# Patient Record
Sex: FEMALE | Race: WHITE | NOT HISPANIC OR LATINO
[De-identification: names, ages, dates, MRNs, and addresses within clinical notes are randomized per-mention and may not be internally consistent; named-entity substitution may affect disease eponyms.]

---

## 2017-04-13 ENCOUNTER — RESULT REVIEW (OUTPATIENT)
Age: 67
End: 2017-04-13

## 2020-10-30 ENCOUNTER — APPOINTMENT (OUTPATIENT)
Dept: INTERNAL MEDICINE | Facility: CLINIC | Age: 70
End: 2020-10-30
Payer: MEDICARE

## 2020-10-30 VITALS
RESPIRATION RATE: 18 BRPM | OXYGEN SATURATION: 96 % | DIASTOLIC BLOOD PRESSURE: 82 MMHG | SYSTOLIC BLOOD PRESSURE: 144 MMHG | HEIGHT: 65 IN | HEART RATE: 77 BPM | WEIGHT: 145 LBS | TEMPERATURE: 97.2 F | BODY MASS INDEX: 24.16 KG/M2

## 2020-10-30 DIAGNOSIS — Z82.61 FAMILY HISTORY OF ARTHRITIS: ICD-10-CM

## 2020-10-30 DIAGNOSIS — Z87.39 PERSONAL HISTORY OF OTHER DISEASES OF THE MUSCULOSKELETAL SYSTEM AND CONNECTIVE TISSUE: ICD-10-CM

## 2020-10-30 DIAGNOSIS — Z86.79 PERSONAL HISTORY OF OTHER DISEASES OF THE CIRCULATORY SYSTEM: ICD-10-CM

## 2020-10-30 DIAGNOSIS — Z82.62 FAMILY HISTORY OF OSTEOPOROSIS: ICD-10-CM

## 2020-10-30 DIAGNOSIS — Z80.9 FAMILY HISTORY OF MALIGNANT NEOPLASM, UNSPECIFIED: ICD-10-CM

## 2020-10-30 PROCEDURE — 99205 OFFICE O/P NEW HI 60 MIN: CPT

## 2020-10-30 RX ORDER — DULOXETINE HYDROCHLORIDE 40 MG/1
CAPSULE, DELAYED RELEASE PELLETS ORAL
Refills: 0 | Status: ACTIVE | COMMUNITY

## 2020-10-30 RX ORDER — PREDNISONE 10 MG/1
TABLET ORAL
Refills: 0 | Status: ACTIVE | COMMUNITY

## 2020-10-30 RX ORDER — LOSARTAN POTASSIUM 100 MG/1
TABLET, FILM COATED ORAL
Refills: 0 | Status: ACTIVE | COMMUNITY

## 2020-10-30 RX ORDER — METOPROLOL SUCCINATE 200 MG/1
TABLET, EXTENDED RELEASE ORAL
Refills: 0 | Status: ACTIVE | COMMUNITY

## 2020-10-30 RX ORDER — ATORVASTATIN CALCIUM 80 MG/1
TABLET, FILM COATED ORAL
Refills: 0 | Status: ACTIVE | COMMUNITY

## 2020-10-30 NOTE — COUNSELING
[Hazards of at-risk alcohol use discussed] : Hazards of at-risk alcohol use discussed [Strategies to reduce or eliminate alcohol use discussed] : Strategies to reduce or eliminate alcohol use discussed [Support options provided] : Support options provided

## 2020-11-03 NOTE — HISTORY OF PRESENT ILLNESS
[FreeTextEntry1] : initial evaluation [de-identified] : Ms. Greenfield presents for initial medical evaluation. She was recently followed by Dr. Nance. Patient is accompanied by her daughter. Over the past 16-24 months Mrs. Greenfield has been having multiple falls. In January she had a fall which caused her to protein fingers and facial lacerations. In June she had a fall which caused bruised ribs as well as a laceration her face and lip. Gen. fall this month which causes low back pain. As per the daughter, Mrs. Greenfield is also forgetful. She will leave doors unlocked, forget her car keys and has forgotten her grandchildren's per days. Mrs. Greenfield did have a neurology evaluation at [facility] prior to culvert pandemic and had an MRI. Patient does admit to significant alcohol use. She is drinking at least 3-4 glasses of wine per day. She will have a glass of wine first thing in the morning. She is also probably drinking vodka as well. Mrs. Greenfield states that this started after her  passed away at least a year ago.

## 2020-11-03 NOTE — PLAN
[FreeTextEntry1] : Mrs. Roberts presents for an initial evaluation. There are multiple issues including cognitive difficulties and frequent falls. I believe that her main issue is alcohol abuse. I've discussed this with the patient and her daughter and tried to emphasize the fact that it is not normal to wake up and start today with a glass of wine. I explained to the patient I think she is drinking more than she is admitting to and that in order to address this issue she has to admit herself that she has a problem drinking. I have recommended that she followup with a neurologist. She plans to followup with Dr. Hiro Khan. Mrs. Greenfield is seeing a therapist, however, as she previously had seen Dr. Darryl Goldman for psychiatry. I have encouraged her to followup with him. Patient will go for a comprehensive blood profile and followup in this office in one month.

## 2020-11-03 NOTE — HEALTH RISK ASSESSMENT
[Yes] : Yes [No] : In the past 12 months have you used drugs other than those required for medical reasons? No [Smoke Detector] : smoke detector [Carbon Monoxide Detector] : carbon monoxide detector [Seat Belt] :  uses seat belt [Sunscreen] : uses sunscreen [Very Good] : ~his/her~  mood as very good [4 or more  times a week (4 pts)] : 4 or more  times a week (4 points) [3 or 4 (1 pt)] : 3 or 4  (1 point) [Monthly (2 pts)] : Monthly (2 points) [Two or more falls in past year] : Patient reported two or more falls in the past year [1] : 2) Feeling down, depressed, or hopeless for several days (1) [] : No [de-identified] : wine [Guns at Home] : no guns at home

## 2020-11-12 LAB
25(OH)D3 SERPL-MCNC: 45.7 NG/ML
ALBUMIN SERPL ELPH-MCNC: 4.3 G/DL
ALP BLD-CCNC: 76 U/L
ALT SERPL-CCNC: 43 U/L
ANION GAP SERPL CALC-SCNC: 14 MMOL/L
APPEARANCE: ABNORMAL
AST SERPL-CCNC: 44 U/L
BACTERIA: NEGATIVE
BASOPHILS # BLD AUTO: 0.06 K/UL
BASOPHILS NFR BLD AUTO: 0.8 %
BILIRUB SERPL-MCNC: 0.5 MG/DL
BILIRUBIN URINE: NEGATIVE
BLOOD URINE: NEGATIVE
BUN SERPL-MCNC: 10 MG/DL
CALCIUM SERPL-MCNC: 10 MG/DL
CHLORIDE SERPL-SCNC: 91 MMOL/L
CHOLEST SERPL-MCNC: 156 MG/DL
CO2 SERPL-SCNC: 25 MMOL/L
COLOR: NORMAL
CREAT SERPL-MCNC: 0.77 MG/DL
EOSINOPHIL # BLD AUTO: 0.36 K/UL
EOSINOPHIL NFR BLD AUTO: 4.9 %
FOLATE SERPL-MCNC: 6 NG/ML
GLUCOSE QUALITATIVE U: NEGATIVE
GLUCOSE SERPL-MCNC: 103 MG/DL
HCT VFR BLD CALC: 43.8 %
HDLC SERPL-MCNC: 48 MG/DL
HGB BLD-MCNC: 14.7 G/DL
HYALINE CASTS: 1 /LPF
IMM GRANULOCYTES NFR BLD AUTO: 0.5 %
IRON SERPL-MCNC: 124 UG/DL
KETONES URINE: NEGATIVE
LDLC SERPL CALC-MCNC: 85 MG/DL
LEUKOCYTE ESTERASE URINE: ABNORMAL
LYMPHOCYTES # BLD AUTO: 1.23 K/UL
LYMPHOCYTES NFR BLD AUTO: 16.6 %
MAN DIFF?: NORMAL
MCHC RBC-ENTMCNC: 33.6 GM/DL
MCHC RBC-ENTMCNC: 33.7 PG
MCV RBC AUTO: 100.5 FL
MICROSCOPIC-UA: NORMAL
MONOCYTES # BLD AUTO: 1.04 K/UL
MONOCYTES NFR BLD AUTO: 14.1 %
NEUTROPHILS # BLD AUTO: 4.67 K/UL
NEUTROPHILS NFR BLD AUTO: 63.1 %
NITRITE URINE: NEGATIVE
NONHDLC SERPL-MCNC: 108 MG/DL
PH URINE: 7.5
PLATELET # BLD AUTO: 419 K/UL
POTASSIUM SERPL-SCNC: 5.1 MMOL/L
PROT SERPL-MCNC: 6.6 G/DL
PROTEIN URINE: NEGATIVE
RBC # BLD: 4.36 M/UL
RBC # FLD: 12.2 %
RED BLOOD CELLS URINE: 4 /HPF
SODIUM SERPL-SCNC: 130 MMOL/L
SPECIFIC GRAVITY URINE: 1.02
SQUAMOUS EPITHELIAL CELLS: 4 /HPF
TRIGL SERPL-MCNC: 117 MG/DL
TSH SERPL-ACNC: 1.9 UIU/ML
UROBILINOGEN URINE: NORMAL
VIT B12 SERPL-MCNC: 552 PG/ML
WBC # FLD AUTO: 7.4 K/UL
WHITE BLOOD CELLS URINE: 5 /HPF

## 2020-11-20 ENCOUNTER — APPOINTMENT (OUTPATIENT)
Dept: INTERNAL MEDICINE | Facility: CLINIC | Age: 70
End: 2020-11-20
Payer: MEDICARE

## 2020-11-20 VITALS
DIASTOLIC BLOOD PRESSURE: 80 MMHG | HEIGHT: 65 IN | HEART RATE: 98 BPM | WEIGHT: 145 LBS | BODY MASS INDEX: 24.16 KG/M2 | RESPIRATION RATE: 16 BRPM | TEMPERATURE: 98.8 F | OXYGEN SATURATION: 97 % | SYSTOLIC BLOOD PRESSURE: 124 MMHG

## 2020-11-20 DIAGNOSIS — E78.5 HYPERLIPIDEMIA, UNSPECIFIED: ICD-10-CM

## 2020-11-20 PROCEDURE — G0008: CPT

## 2020-11-20 PROCEDURE — 90732 PPSV23 VACC 2 YRS+ SUBQ/IM: CPT

## 2020-11-20 PROCEDURE — 90662 IIV NO PRSV INCREASED AG IM: CPT

## 2020-11-20 PROCEDURE — G0009: CPT

## 2020-11-20 PROCEDURE — 99214 OFFICE O/P EST MOD 30 MIN: CPT | Mod: 25

## 2020-11-20 NOTE — HISTORY OF PRESENT ILLNESS
[FreeTextEntry1] : Followup evaluation [de-identified] : Mrs. Greenfield presents for a followup evaluation. She is feeling well. Patient states that she has not had a drink in the past 8 days, however, I spoke with her son via telephone prior to this office visit and he stated that she does continue to drink. Mrs. Greenfield continues to see a therapist as well as psychiatrist. Her Cymbalta was increased to 90 mg daily. She had a neurology evaluation by  and is currently undergoing a workup for her cognitive impairment.

## 2020-11-20 NOTE — PLAN
[FreeTextEntry1] : Mrs. Greenfield presents for followup evaluation. Comprehensive blood profile was reviewed with the patient. She is mild hyponatremia. She states she's been sober for the past 8 days, however, her son states she is still drinking. She will continue to followup with her therapist and her psychiatrist. This will repeat his CBC and complete metabolic profile. Followup in 3 months. She will receive the flu vaccine and a pneumococcal vaccine.

## 2021-02-08 RX ORDER — FENOFIBRIC ACID 105 MG/1
TABLET ORAL
Refills: 0 | Status: DISCONTINUED | COMMUNITY
End: 2021-02-08

## 2021-02-09 RX ORDER — FENOFIBRIC ACID 135 MG/1
135 CAPSULE, DELAYED RELEASE ORAL
Qty: 90 | Refills: 0 | Status: ACTIVE | COMMUNITY
Start: 1900-01-01 | End: 1900-01-01

## 2021-02-19 ENCOUNTER — APPOINTMENT (OUTPATIENT)
Dept: INTERNAL MEDICINE | Facility: CLINIC | Age: 71
End: 2021-02-19

## 2021-02-23 ENCOUNTER — LABORATORY RESULT (OUTPATIENT)
Age: 71
End: 2021-02-23

## 2021-03-02 ENCOUNTER — APPOINTMENT (OUTPATIENT)
Dept: INTERNAL MEDICINE | Facility: CLINIC | Age: 71
End: 2021-03-02
Payer: MEDICARE

## 2021-03-02 ENCOUNTER — NON-APPOINTMENT (OUTPATIENT)
Age: 71
End: 2021-03-02

## 2021-03-02 VITALS
WEIGHT: 142 LBS | BODY MASS INDEX: 23.66 KG/M2 | SYSTOLIC BLOOD PRESSURE: 124 MMHG | OXYGEN SATURATION: 96 % | HEART RATE: 72 BPM | TEMPERATURE: 97.5 F | HEIGHT: 65 IN | DIASTOLIC BLOOD PRESSURE: 84 MMHG | RESPIRATION RATE: 16 BRPM

## 2021-03-02 DIAGNOSIS — I10 ESSENTIAL (PRIMARY) HYPERTENSION: ICD-10-CM

## 2021-03-02 DIAGNOSIS — H26.9 UNSPECIFIED CATARACT: ICD-10-CM

## 2021-03-02 DIAGNOSIS — R41.89 OTHER SYMPTOMS AND SIGNS INVOLVING COGNITIVE FUNCTIONS AND AWARENESS: ICD-10-CM

## 2021-03-02 DIAGNOSIS — E87.1 HYPO-OSMOLALITY AND HYPONATREMIA: ICD-10-CM

## 2021-03-02 DIAGNOSIS — F41.8 OTHER SPECIFIED ANXIETY DISORDERS: ICD-10-CM

## 2021-03-02 PROCEDURE — 99214 OFFICE O/P EST MOD 30 MIN: CPT | Mod: 25

## 2021-03-02 PROCEDURE — 93000 ELECTROCARDIOGRAM COMPLETE: CPT

## 2021-03-02 NOTE — ASSESSMENT
[Patient Optimized for Surgery] : Patient optimized for surgery [ECG] : ECG [As per surgery] : as per surgery [FreeTextEntry4] : EKG shows a normal sinus rhythm with normal axis and intervals. There is no contraindication to planned cataract surgery with sedation/anesthesia.

## 2021-03-02 NOTE — HISTORY OF PRESENT ILLNESS
[No Pertinent Cardiac History] : no history of aortic stenosis, atrial fibrillation, coronary artery disease, recent myocardial infarction, or implantable device/pacemaker [No Pertinent Pulmonary History] : no history of asthma, COPD, sleep apnea, or smoking [No Adverse Anesthesia Reaction] : no adverse anesthesia reaction in self or family member [Chronic Anticoagulation] : no chronic anticoagulation [Chronic Kidney Disease] : no chronic kidney disease [Diabetes] : no diabetes [(Patient denies any chest pain, claudication, dyspnea on exertion, orthopnea, palpitations or syncope)] : Patient denies any chest pain, claudication, dyspnea on exertion, orthopnea, palpitations or syncope [FreeTextEntry1] : Cataract surgery [FreeTextEntry3] :

## 2021-03-05 ENCOUNTER — APPOINTMENT (OUTPATIENT)
Dept: DISASTER EMERGENCY | Facility: CLINIC | Age: 71
End: 2021-03-05

## 2021-03-05 RX ORDER — OFLOXACIN 0.3 %
1 DROPS OPHTHALMIC (EYE)
Refills: 0 | Status: COMPLETED | OUTPATIENT
Start: 2021-03-08 | End: 2021-03-08

## 2021-03-05 RX ORDER — TROPICAMIDE 1 %
1 DROPS OPHTHALMIC (EYE)
Refills: 0 | Status: COMPLETED | OUTPATIENT
Start: 2021-03-08 | End: 2021-03-08

## 2021-03-05 RX ORDER — CYCLOPENTOLATE HYDROCHLORIDE 10 MG/ML
1 SOLUTION/ DROPS OPHTHALMIC
Refills: 0 | Status: COMPLETED | OUTPATIENT
Start: 2021-03-08 | End: 2021-03-08

## 2021-03-05 RX ORDER — PHENYLEPHRINE HCL 2.5 %
1 DROPS OPHTHALMIC (EYE)
Refills: 0 | Status: COMPLETED | OUTPATIENT
Start: 2021-03-08 | End: 2021-03-08

## 2021-03-05 NOTE — ASU PATIENT PROFILE, ADULT - PMH
Cognitive impairment    Depression    ETOH abuse    Hypertension, unspecified type    Hyponatremia

## 2021-03-06 LAB — SARS-COV-2 N GENE NPH QL NAA+PROBE: NOT DETECTED

## 2021-03-08 ENCOUNTER — OUTPATIENT (OUTPATIENT)
Dept: INPATIENT UNIT | Facility: HOSPITAL | Age: 71
LOS: 1 days | Discharge: HOME | End: 2021-03-08
Payer: MEDICARE

## 2021-03-08 VITALS
OXYGEN SATURATION: 100 % | WEIGHT: 143.08 LBS | HEIGHT: 65 IN | DIASTOLIC BLOOD PRESSURE: 79 MMHG | TEMPERATURE: 97 F | SYSTOLIC BLOOD PRESSURE: 118 MMHG | RESPIRATION RATE: 16 BRPM | HEART RATE: 106 BPM

## 2021-03-08 VITALS
TEMPERATURE: 98 F | HEART RATE: 92 BPM | DIASTOLIC BLOOD PRESSURE: 80 MMHG | RESPIRATION RATE: 16 BRPM | OXYGEN SATURATION: 100 % | SYSTOLIC BLOOD PRESSURE: 119 MMHG

## 2021-03-08 DIAGNOSIS — Z98.890 OTHER SPECIFIED POSTPROCEDURAL STATES: Chronic | ICD-10-CM

## 2021-03-08 DIAGNOSIS — H53.9 UNSPECIFIED VISUAL DISTURBANCE: ICD-10-CM

## 2021-03-08 DIAGNOSIS — H25.11 AGE-RELATED NUCLEAR CATARACT, RIGHT EYE: ICD-10-CM

## 2021-03-08 PROCEDURE — C1780: CPT

## 2021-03-08 RX ORDER — ACETAMINOPHEN 500 MG
2 TABLET ORAL
Qty: 0 | Refills: 0 | DISCHARGE
Start: 2021-03-08

## 2021-03-08 RX ORDER — ACETAMINOPHEN 500 MG
650 TABLET ORAL EVERY 6 HOURS
Refills: 0 | Status: DISCONTINUED | OUTPATIENT
Start: 2021-03-08 | End: 2021-03-08

## 2021-03-08 RX ADMIN — CYCLOPENTOLATE HYDROCHLORIDE 1 DROP(S): 10 SOLUTION/ DROPS OPHTHALMIC at 14:33

## 2021-03-08 RX ADMIN — Medication 1 DROP(S): at 14:44

## 2021-03-08 RX ADMIN — Medication 1 DROP(S): at 14:32

## 2021-03-08 RX ADMIN — Medication 1 DROP(S): at 14:21

## 2021-03-08 RX ADMIN — CYCLOPENTOLATE HYDROCHLORIDE 1 DROP(S): 10 SOLUTION/ DROPS OPHTHALMIC at 14:44

## 2021-03-08 RX ADMIN — Medication 1 DROP(S): at 14:33

## 2021-03-08 RX ADMIN — CYCLOPENTOLATE HYDROCHLORIDE 1 DROP(S): 10 SOLUTION/ DROPS OPHTHALMIC at 14:22

## 2021-03-08 NOTE — BRIEF OPERATIVE NOTE - NSICDXBRIEFPOSTOP_GEN_ALL_CORE_FT
Contacted ER nurse, Yanique, regarding coordinating dialysis for patient this afternoon. Per nurse, patient still in ER and awaiting room, and possible plans for cardiac cath. Unknown when patient will be admitted to room and available to receive dialysis. Kayden LEIVA notified, orders to proceed with plans to dialyze patient tonight by on-call nurse after patient is admitted and back from procedure. Dialysis charge nurse, Melonie Oswald, notified.    POST-OP DIAGNOSIS:  Nuclear sclerotic cataract, right 08-Mar-2021 15:42:54  Camron Oliveira

## 2021-03-08 NOTE — ASU PREOP CHECKLIST - BSA (M2)
BREAST CARE COORDINATOR NOTE:  Patient contacted this RN stating that she can feel a hard oblong lump( Hematoma)  approximately 1 inch in size since her breast biopsy on 12/05/18. Patient states she still has some bruising as well. She notes the lump is not growing in size but not reabsorbing as she had hoped. Patient will keep an eye on it. Writer recommended that patient call her PCP for an office visit in the next week. She also understands that if this hematoma starts to become larger she should be seen in Urgent care or ER.
1.72

## 2021-03-08 NOTE — BRIEF OPERATIVE NOTE - NSICDXBRIEFPROCEDURE_GEN_ALL_CORE_FT
PROCEDURES:  Cataract extract, extracaps, phacoemuls, w/ prosth lens insertn, 1 stage 08-Mar-2021 15:42:30  Camron Oliveira

## 2021-03-12 DIAGNOSIS — H25.11 AGE-RELATED NUCLEAR CATARACT, RIGHT EYE: ICD-10-CM

## 2021-03-12 DIAGNOSIS — I10 ESSENTIAL (PRIMARY) HYPERTENSION: ICD-10-CM

## 2021-03-12 DIAGNOSIS — M19.90 UNSPECIFIED OSTEOARTHRITIS, UNSPECIFIED SITE: ICD-10-CM

## 2021-03-18 PROBLEM — E87.1 HYPO-OSMOLALITY AND HYPONATREMIA: Chronic | Status: ACTIVE | Noted: 2021-03-05

## 2021-03-18 PROBLEM — I10 ESSENTIAL (PRIMARY) HYPERTENSION: Chronic | Status: ACTIVE | Noted: 2021-03-05

## 2021-03-18 PROBLEM — F10.10 ALCOHOL ABUSE, UNCOMPLICATED: Chronic | Status: ACTIVE | Noted: 2021-03-05

## 2021-03-18 PROBLEM — R41.89 OTHER SYMPTOMS AND SIGNS INVOLVING COGNITIVE FUNCTIONS AND AWARENESS: Chronic | Status: ACTIVE | Noted: 2021-03-05

## 2021-03-18 PROBLEM — F32.9 MAJOR DEPRESSIVE DISORDER, SINGLE EPISODE, UNSPECIFIED: Chronic | Status: ACTIVE | Noted: 2021-03-05

## 2021-03-19 ENCOUNTER — RESULT REVIEW (OUTPATIENT)
Age: 71
End: 2021-03-19

## 2021-03-19 ENCOUNTER — APPOINTMENT (OUTPATIENT)
Dept: RADIOLOGY | Facility: CLINIC | Age: 71
End: 2021-03-19
Payer: MEDICARE

## 2021-03-19 ENCOUNTER — NON-APPOINTMENT (OUTPATIENT)
Age: 71
End: 2021-03-19

## 2021-03-19 ENCOUNTER — OUTPATIENT (OUTPATIENT)
Dept: OUTPATIENT SERVICES | Facility: HOSPITAL | Age: 71
LOS: 1 days | End: 2021-03-19
Payer: MEDICARE

## 2021-03-19 ENCOUNTER — APPOINTMENT (OUTPATIENT)
Dept: INTERNAL MEDICINE | Facility: CLINIC | Age: 71
End: 2021-03-19
Payer: MEDICARE

## 2021-03-19 VITALS
TEMPERATURE: 97.6 F | WEIGHT: 142 LBS | HEIGHT: 65 IN | DIASTOLIC BLOOD PRESSURE: 70 MMHG | BODY MASS INDEX: 23.66 KG/M2 | SYSTOLIC BLOOD PRESSURE: 110 MMHG | RESPIRATION RATE: 16 BRPM | OXYGEN SATURATION: 96 % | HEART RATE: 84 BPM

## 2021-03-19 DIAGNOSIS — Z98.890 OTHER SPECIFIED POSTPROCEDURAL STATES: Chronic | ICD-10-CM

## 2021-03-19 DIAGNOSIS — Z01.818 ENCOUNTER FOR OTHER PREPROCEDURAL EXAMINATION: ICD-10-CM

## 2021-03-19 DIAGNOSIS — M25.551 PAIN IN RIGHT HIP: ICD-10-CM

## 2021-03-19 DIAGNOSIS — Z23 ENCOUNTER FOR IMMUNIZATION: ICD-10-CM

## 2021-03-19 PROCEDURE — 73502 X-RAY EXAM HIP UNI 2-3 VIEWS: CPT | Mod: 26,RT

## 2021-03-19 PROCEDURE — 72100 X-RAY EXAM L-S SPINE 2/3 VWS: CPT

## 2021-03-19 PROCEDURE — 72100 X-RAY EXAM L-S SPINE 2/3 VWS: CPT | Mod: 26

## 2021-03-19 PROCEDURE — 73502 X-RAY EXAM HIP UNI 2-3 VIEWS: CPT

## 2021-03-19 PROCEDURE — 99214 OFFICE O/P EST MOD 30 MIN: CPT

## 2021-03-19 RX ORDER — TIZANIDINE 2 MG/1
2 TABLET ORAL
Qty: 20 | Refills: 0 | Status: ACTIVE | COMMUNITY
Start: 2021-03-19 | End: 1900-01-01

## 2021-03-19 RX ORDER — METHYLPREDNISOLONE 4 MG/1
4 TABLET ORAL
Qty: 1 | Refills: 0 | Status: ACTIVE | COMMUNITY
Start: 2021-03-19 | End: 1900-01-01

## 2021-03-19 NOTE — PHYSICAL EXAM
[No Acute Distress] : no acute distress [Well Nourished] : well nourished [Well Developed] : well developed [No Accessory Muscle Use] : no accessory muscle use [Clear to Auscultation] : lungs were clear to auscultation bilaterally [Normal Rate] : normal rate  [Regular Rhythm] : with a regular rhythm [Normal S1, S2] : normal S1 and S2 [No Joint Swelling] : no joint swelling [Grossly Normal Strength/Tone] : grossly normal strength/tone [Coordination Grossly Intact] : coordination grossly intact [No Focal Deficits] : no focal deficits [Normal Gait] : normal gait [Normal Affect] : the affect was normal [Alert and Oriented x3] : oriented to person, place, and time [Normal Insight/Judgement] : insight and judgment were intact [de-identified] : lumbar tenderness on palpation

## 2021-03-19 NOTE — ASSESSMENT
[FreeTextEntry1] : lumbar/ right hip pain\par XRAY lumbar, pelvis  right hip r/o bony abnormality \par Medrol dose pack, take with food'\par Tizanidine 4 mp po q 8 hrs PRN, spasm. Pt understands may cause drowsiness\par F/up with XRAY results\par IF sx's persist , recommend consult  with ORTHO \par \par

## 2021-03-19 NOTE — HISTORY OF PRESENT ILLNESS
[FreeTextEntry8] : Pt presents  to the office today with complaints of lumbar back pain and right hip pain. She does not recall any precipitating events. She reports feels like a dull ache, has been taking Advil round the clock for the last 2 days which has helped. Denies any radiculopathy symptoms. Is having trouble sleeping at night, She has full mobility and range of motion. She as some difficulty with activities of daily living. \par Denies fever, chills, shortness of  breath, chest pain, N/V, dizziness.

## 2021-03-19 NOTE — REVIEW OF SYSTEMS
[Chills] : no chills [Fatigue] : no fatigue [Joint Stiffness] : no joint stiffness [Joint Swelling] : no joint swelling [Muscle Weakness] : no muscle weakness [Muscle Pain] : muscle pain [Back Pain] : back pain [Negative] : Heme/Lymph [FreeTextEntry9] : see HPI

## 2021-03-21 ENCOUNTER — TRANSCRIPTION ENCOUNTER (OUTPATIENT)
Age: 71
End: 2021-03-21

## 2021-04-10 ENCOUNTER — TRANSCRIPTION ENCOUNTER (OUTPATIENT)
Age: 71
End: 2021-04-10

## 2021-04-15 DIAGNOSIS — Z01.818 ENCOUNTER FOR OTHER PREPROCEDURAL EXAMINATION: ICD-10-CM

## 2021-04-16 ENCOUNTER — APPOINTMENT (OUTPATIENT)
Dept: DISASTER EMERGENCY | Facility: CLINIC | Age: 71
End: 2021-04-16

## 2021-04-17 LAB — SARS-COV-2 N GENE NPH QL NAA+PROBE: NOT DETECTED

## 2021-04-19 ENCOUNTER — OUTPATIENT (OUTPATIENT)
Dept: INPATIENT UNIT | Facility: HOSPITAL | Age: 71
LOS: 1 days | Discharge: HOME | End: 2021-04-19
Payer: MEDICARE

## 2021-04-19 VITALS
DIASTOLIC BLOOD PRESSURE: 57 MMHG | RESPIRATION RATE: 15 BRPM | HEART RATE: 72 BPM | TEMPERATURE: 99 F | SYSTOLIC BLOOD PRESSURE: 95 MMHG | OXYGEN SATURATION: 100 %

## 2021-04-19 VITALS
TEMPERATURE: 97 F | OXYGEN SATURATION: 99 % | HEIGHT: 65 IN | WEIGHT: 141.98 LBS | HEART RATE: 74 BPM | RESPIRATION RATE: 16 BRPM | DIASTOLIC BLOOD PRESSURE: 71 MMHG | SYSTOLIC BLOOD PRESSURE: 112 MMHG

## 2021-04-19 DIAGNOSIS — H25.12 AGE-RELATED NUCLEAR CATARACT, LEFT EYE: ICD-10-CM

## 2021-04-19 DIAGNOSIS — H26.9 UNSPECIFIED CATARACT: ICD-10-CM

## 2021-04-19 DIAGNOSIS — F10.10 ALCOHOL ABUSE, UNCOMPLICATED: ICD-10-CM

## 2021-04-19 DIAGNOSIS — F41.9 ANXIETY DISORDER, UNSPECIFIED: ICD-10-CM

## 2021-04-19 DIAGNOSIS — E78.5 HYPERLIPIDEMIA, UNSPECIFIED: ICD-10-CM

## 2021-04-19 DIAGNOSIS — Z98.890 OTHER SPECIFIED POSTPROCEDURAL STATES: Chronic | ICD-10-CM

## 2021-04-19 DIAGNOSIS — F32.9 MAJOR DEPRESSIVE DISORDER, SINGLE EPISODE, UNSPECIFIED: ICD-10-CM

## 2021-04-19 DIAGNOSIS — I10 ESSENTIAL (PRIMARY) HYPERTENSION: ICD-10-CM

## 2021-04-19 DIAGNOSIS — H53.9 UNSPECIFIED VISUAL DISTURBANCE: ICD-10-CM

## 2021-04-19 DIAGNOSIS — Z79.899 OTHER LONG TERM (CURRENT) DRUG THERAPY: ICD-10-CM

## 2021-04-19 PROCEDURE — V2632: CPT

## 2021-04-19 RX ORDER — PHENYLEPHRINE HCL 2.5 %
1 DROPS OPHTHALMIC (EYE)
Refills: 0 | Status: COMPLETED | OUTPATIENT
Start: 2021-04-19 | End: 2021-04-19

## 2021-04-19 RX ORDER — ACETAMINOPHEN 500 MG
650 TABLET ORAL EVERY 6 HOURS
Refills: 0 | Status: DISCONTINUED | OUTPATIENT
Start: 2021-04-19 | End: 2021-04-19

## 2021-04-19 RX ORDER — TROPICAMIDE 1 %
1 DROPS OPHTHALMIC (EYE)
Refills: 0 | Status: COMPLETED | OUTPATIENT
Start: 2021-04-19 | End: 2021-04-19

## 2021-04-19 RX ORDER — CYCLOPENTOLATE HYDROCHLORIDE 10 MG/ML
1 SOLUTION/ DROPS OPHTHALMIC
Refills: 0 | Status: COMPLETED | OUTPATIENT
Start: 2021-04-19 | End: 2021-04-19

## 2021-04-19 RX ORDER — OFLOXACIN 0.3 %
1 DROPS OPHTHALMIC (EYE)
Refills: 0 | Status: COMPLETED | OUTPATIENT
Start: 2021-04-19 | End: 2021-04-19

## 2021-04-19 RX ADMIN — Medication 1 DROP(S): at 11:19

## 2021-04-19 RX ADMIN — CYCLOPENTOLATE HYDROCHLORIDE 1 DROP(S): 10 SOLUTION/ DROPS OPHTHALMIC at 11:29

## 2021-04-19 RX ADMIN — Medication 1 DROP(S): at 11:29

## 2021-04-19 RX ADMIN — Medication 1 DROP(S): at 11:20

## 2021-04-19 RX ADMIN — Medication 1 DROP(S): at 11:07

## 2021-04-19 RX ADMIN — CYCLOPENTOLATE HYDROCHLORIDE 1 DROP(S): 10 SOLUTION/ DROPS OPHTHALMIC at 11:07

## 2021-04-19 RX ADMIN — Medication 1 DROP(S): at 10:50

## 2021-04-19 RX ADMIN — CYCLOPENTOLATE HYDROCHLORIDE 1 DROP(S): 10 SOLUTION/ DROPS OPHTHALMIC at 11:20

## 2021-04-19 NOTE — ASU DISCHARGE PLAN (ADULT/PEDIATRIC) - PROCEDURE
Here for  hormone therapy injection, no complaints at this time , Injection given as ordered, tolerated well, no report of pain prior to or after injection. Return to clinic as scheduled.     Site - RB    Testosterone  50 mg  Depo Estradiol  10  mg    Clinic Supplied Medication   cataract sx

## 2021-04-19 NOTE — BRIEF OPERATIVE NOTE - NSICDXBRIEFPROCEDURE_GEN_ALL_CORE_FT
PROCEDURES:  Cataract extract, extracaps, phacoemuls, w/ prosth lens insertn, 1 stage 19-Apr-2021 12:33:25  Camron Oliveira

## 2021-09-14 ENCOUNTER — APPOINTMENT (OUTPATIENT)
Dept: INTERNAL MEDICINE | Facility: CLINIC | Age: 71
End: 2021-09-14

## 2022-02-16 ENCOUNTER — EMERGENCY (EMERGENCY)
Facility: HOSPITAL | Age: 72
LOS: 0 days | Discharge: HOME | End: 2022-02-16
Attending: EMERGENCY MEDICINE
Payer: MEDICARE

## 2022-02-16 VITALS
OXYGEN SATURATION: 100 % | RESPIRATION RATE: 18 BRPM | HEART RATE: 80 BPM | SYSTOLIC BLOOD PRESSURE: 131 MMHG | TEMPERATURE: 98 F | DIASTOLIC BLOOD PRESSURE: 80 MMHG

## 2022-02-16 VITALS
TEMPERATURE: 98 F | DIASTOLIC BLOOD PRESSURE: 86 MMHG | WEIGHT: 149.91 LBS | SYSTOLIC BLOOD PRESSURE: 129 MMHG | HEART RATE: 87 BPM | RESPIRATION RATE: 18 BRPM | HEIGHT: 65 IN | OXYGEN SATURATION: 100 %

## 2022-02-16 DIAGNOSIS — Z98.890 OTHER SPECIFIED POSTPROCEDURAL STATES: Chronic | ICD-10-CM

## 2022-02-16 DIAGNOSIS — F10.129 ALCOHOL ABUSE WITH INTOXICATION, UNSPECIFIED: ICD-10-CM

## 2022-02-16 DIAGNOSIS — Z20.822 CONTACT WITH AND (SUSPECTED) EXPOSURE TO COVID-19: ICD-10-CM

## 2022-02-16 DIAGNOSIS — F32.A DEPRESSION, UNSPECIFIED: ICD-10-CM

## 2022-02-16 DIAGNOSIS — I10 ESSENTIAL (PRIMARY) HYPERTENSION: ICD-10-CM

## 2022-02-16 LAB
ALBUMIN SERPL ELPH-MCNC: 3.6 G/DL — SIGNIFICANT CHANGE UP (ref 3.3–5)
ALP SERPL-CCNC: 90 U/L — SIGNIFICANT CHANGE UP (ref 40–120)
ALT FLD-CCNC: 86 U/L — HIGH (ref 12–78)
ANION GAP SERPL CALC-SCNC: 11 MMOL/L — SIGNIFICANT CHANGE UP (ref 5–17)
APAP SERPL-MCNC: < 2 UG/ML (ref 10–30)
APPEARANCE UR: CLEAR — SIGNIFICANT CHANGE UP
AST SERPL-CCNC: 89 U/L — HIGH (ref 15–37)
BASOPHILS # BLD AUTO: 0.04 K/UL — SIGNIFICANT CHANGE UP (ref 0–0.2)
BASOPHILS NFR BLD AUTO: 0.8 % — SIGNIFICANT CHANGE UP (ref 0–2)
BILIRUB SERPL-MCNC: 0.7 MG/DL — SIGNIFICANT CHANGE UP (ref 0.2–1.2)
BILIRUB UR-MCNC: NEGATIVE — SIGNIFICANT CHANGE UP
BUN SERPL-MCNC: 11 MG/DL — SIGNIFICANT CHANGE UP (ref 7–23)
CALCIUM SERPL-MCNC: 9 MG/DL — SIGNIFICANT CHANGE UP (ref 8.5–10.1)
CHLORIDE SERPL-SCNC: 94 MMOL/L — LOW (ref 96–108)
CO2 SERPL-SCNC: 24 MMOL/L — SIGNIFICANT CHANGE UP (ref 22–31)
COLOR SPEC: YELLOW — SIGNIFICANT CHANGE UP
CREAT SERPL-MCNC: 0.7 MG/DL — SIGNIFICANT CHANGE UP (ref 0.5–1.3)
DIFF PNL FLD: ABNORMAL
EOSINOPHIL # BLD AUTO: 0.14 K/UL — SIGNIFICANT CHANGE UP (ref 0–0.5)
EOSINOPHIL NFR BLD AUTO: 2.7 % — SIGNIFICANT CHANGE UP (ref 0–6)
ETHANOL SERPL-MCNC: 233 MG/DL — HIGH (ref 0–10)
GLUCOSE SERPL-MCNC: 99 MG/DL — SIGNIFICANT CHANGE UP (ref 70–99)
GLUCOSE UR QL: NEGATIVE — SIGNIFICANT CHANGE UP
HCT VFR BLD CALC: 43.1 % — SIGNIFICANT CHANGE UP (ref 34.5–45)
HGB BLD-MCNC: 15 G/DL — SIGNIFICANT CHANGE UP (ref 11.5–15.5)
IMM GRANULOCYTES NFR BLD AUTO: 0.4 % — SIGNIFICANT CHANGE UP (ref 0–1.5)
KETONES UR-MCNC: NEGATIVE — SIGNIFICANT CHANGE UP
LEUKOCYTE ESTERASE UR-ACNC: ABNORMAL
LYMPHOCYTES # BLD AUTO: 1.46 K/UL — SIGNIFICANT CHANGE UP (ref 1–3.3)
LYMPHOCYTES # BLD AUTO: 28.2 % — SIGNIFICANT CHANGE UP (ref 13–44)
MCHC RBC-ENTMCNC: 33 PG — SIGNIFICANT CHANGE UP (ref 27–34)
MCHC RBC-ENTMCNC: 34.8 GM/DL — SIGNIFICANT CHANGE UP (ref 32–36)
MCV RBC AUTO: 94.9 FL — SIGNIFICANT CHANGE UP (ref 80–100)
MONOCYTES # BLD AUTO: 0.81 K/UL — SIGNIFICANT CHANGE UP (ref 0–0.9)
MONOCYTES NFR BLD AUTO: 15.7 % — HIGH (ref 2–14)
NEUTROPHILS # BLD AUTO: 2.7 K/UL — SIGNIFICANT CHANGE UP (ref 1.8–7.4)
NEUTROPHILS NFR BLD AUTO: 52.2 % — SIGNIFICANT CHANGE UP (ref 43–77)
NITRITE UR-MCNC: NEGATIVE — SIGNIFICANT CHANGE UP
PCP SPEC-MCNC: SIGNIFICANT CHANGE UP
PH UR: 6 — SIGNIFICANT CHANGE UP (ref 5–8)
PLATELET # BLD AUTO: 201 K/UL — SIGNIFICANT CHANGE UP (ref 150–400)
POTASSIUM SERPL-MCNC: 3.8 MMOL/L — SIGNIFICANT CHANGE UP (ref 3.5–5.3)
POTASSIUM SERPL-SCNC: 3.8 MMOL/L — SIGNIFICANT CHANGE UP (ref 3.5–5.3)
PROT SERPL-MCNC: 7.3 GM/DL — SIGNIFICANT CHANGE UP (ref 6–8.3)
PROT UR-MCNC: 15
RBC # BLD: 4.54 M/UL — SIGNIFICANT CHANGE UP (ref 3.8–5.2)
RBC # FLD: 12.9 % — SIGNIFICANT CHANGE UP (ref 10.3–14.5)
SALICYLATES SERPL-MCNC: <1.7 MG/DL — LOW (ref 2.8–20)
SARS-COV-2 RNA SPEC QL NAA+PROBE: SIGNIFICANT CHANGE UP
SODIUM SERPL-SCNC: 129 MMOL/L — LOW (ref 135–145)
SP GR SPEC: 1.01 — SIGNIFICANT CHANGE UP (ref 1.01–1.02)
TSH SERPL-MCNC: 5.16 UU/ML — HIGH (ref 0.34–4.82)
UROBILINOGEN FLD QL: 1
WBC # BLD: 5.17 K/UL — SIGNIFICANT CHANGE UP (ref 3.8–10.5)
WBC # FLD AUTO: 5.17 K/UL — SIGNIFICANT CHANGE UP (ref 3.8–10.5)

## 2022-02-16 PROCEDURE — 36415 COLL VENOUS BLD VENIPUNCTURE: CPT

## 2022-02-16 PROCEDURE — 87635 SARS-COV-2 COVID-19 AMP PRB: CPT

## 2022-02-16 PROCEDURE — 81001 URINALYSIS AUTO W/SCOPE: CPT

## 2022-02-16 PROCEDURE — 80053 COMPREHEN METABOLIC PANEL: CPT

## 2022-02-16 PROCEDURE — 99284 EMERGENCY DEPT VISIT MOD MDM: CPT

## 2022-02-16 PROCEDURE — 93010 ELECTROCARDIOGRAM REPORT: CPT

## 2022-02-16 PROCEDURE — 84443 ASSAY THYROID STIM HORMONE: CPT

## 2022-02-16 PROCEDURE — 80307 DRUG TEST PRSMV CHEM ANLYZR: CPT

## 2022-02-16 PROCEDURE — 93005 ELECTROCARDIOGRAM TRACING: CPT

## 2022-02-16 PROCEDURE — 85025 COMPLETE CBC W/AUTO DIFF WBC: CPT

## 2022-02-16 PROCEDURE — 99285 EMERGENCY DEPT VISIT HI MDM: CPT

## 2022-02-16 RX ORDER — SODIUM CHLORIDE 9 MG/ML
1000 INJECTION INTRAMUSCULAR; INTRAVENOUS; SUBCUTANEOUS ONCE
Refills: 0 | Status: COMPLETED | OUTPATIENT
Start: 2022-02-16 | End: 2022-02-16

## 2022-02-16 RX ADMIN — SODIUM CHLORIDE 1000 MILLILITER(S): 9 INJECTION INTRAMUSCULAR; INTRAVENOUS; SUBCUTANEOUS at 01:58

## 2022-02-16 NOTE — ED PROVIDER NOTE - PROGRESS NOTE DETAILS
pt re evaluate, awake, alert, clinically sober. denies si or hi, states she saw her pmd 1 week ago, had a booster shot and was "wiped out"  she also restarted her medicationi because she got them refilled because she was going to [place] today to her sisters house. Last night she drank vodka and her daughter said she didn't " sound right" so that's when she was sent to hospital for evaluation. SANDRA Spencer DO

## 2022-02-16 NOTE — ED ADULT NURSE NOTE - OBJECTIVE STATEMENT
Pt presents with BIBA stating that she drinks daily, drank wine and vodka last night and her daughter was concerned shes not acting right and mixing anxiety pills with alcohol and called EMS.  Pt denies taking any medication since taking her BP medication this am.  States she took the booster on Tuesday and has been lethargic since then.  Pt compliant and A&Ox3. No c/o pain SOB or CP, pt awaiting Md bedside.

## 2022-02-16 NOTE — ED PROVIDER NOTE - NSFOLLOWUPINSTRUCTIONS_ED_ALL_ED_FT
Alcohol Intoxication      Alcohol intoxication happens when you cannot think clearly or function well (get impaired) after drinking alcohol. This can happen after just one drink. The effect that alcohol has on how you think and function depends on:  •How much alcohol you drank.      •Your age, your weight, and whether you are a man or a woman.      •How often you drink alcohol.      •If you have other medical problems.      Alcohol intoxication can range from mild to very bad. It can be dangerous, especially if you:•Drink a large amount of alcohol in a short time (binge drink).  •For women, binge drinking is having four or more drinks at one time.      •For men, binge drinking is having five or more drinks at one time.        •Take certain drugs or medicines.      If you or anyone around you seems intoxicated:  •Tell someone.      •Get help from someone.        Follow these instructions at home:      Eating and drinking    •Ask your doctor if alcohol is safe for you.•If your doctor says that alcohol is safe for you, limit how much you drink to no more than 1 drink a day for women who are not pregnant and 2 drinks a day for men. One drink equals one of these:  •12 oz of beer.      •5 oz of wine.      •1½ oz of hard liquor.      •Do not drink alcohol if:  •Your doctor tells you not to drink.      •You are pregnant, may be pregnant, or are planning to get pregnant.      •You are under the legal drinking age (21 years old in the [place]      •You are taking medicines that you should not take with alcohol.      •Alcohol causes your medical problem to get worse.      •You have to drive or do activities that need you to be alert.      •You have substance use disorder. This is when using alcohol again and again causes problems with your health, your relationships, or with what you need to do at work, home, or school.        •Be sure to eat before you drink alcohol. Avoid drinking when you have an empty stomach.    •Make sure you have enough fluid in your body (stay hydrated). To do this:  •Drink enough fluid to keep your pee (urine) pale yellow.      •Avoid caffeine, which may be in coffee, tea, and some sodas. Caffeine can make you thirsty.        •Try not to drink more than one drink an hour.      •If you are having more than one drink, have a drink without alcohol (such as water) between your drinks.          General instructions      •Take over-the-counter and prescription medicines only as told by your doctor.      • Do not drive after drinking any amount of alcohol. Plan for a designated  or another way to go home.      •Have someone you trust stay with you while you are intoxicated. Youshould not be left alone.      •Keep all follow-up visits as told by your doctor. This is important.        Contact a doctor if:    •You do not feel better after a few days.      •You have problems at work, at school, or at home due to drinking.        Get help right away if:  •You have any of the following:•Moderate or very bad trouble with:  •Movement (coordination).      •Talking.      •Memory.      •Paying attention to things.        •Trouble staying awake.      •Being very confused.      •Jerky movements that you cannot control (seizure).      •Light-headedness.      •Fainting.      •Throwing up (vomiting) blood. The blood may be bright red or look like coffee grounds.    •Blood in your poop (stool). The blood may:  •Be bright red.      •Make your poop black and tarry and make it smell bad.        •Feeling shaky when you try to stop drinking.      •Thoughts about hurting yourself or others.        If you ever feel like you may hurt yourself or others, or have thoughts about taking your own life, get help right away. You can go to your nearest emergency department or call:   • Your local emergency services (911 in the [place]       • A suicide crisis helpline, such as the National Suicide Prevention Lifeline at 1-612.182.9281. This is open 24 hours a day.         Summary    •Alcohol intoxication happens when you cannot think clearly or function well (get impaired) after drinking alcohol. This can happen after just one drink.      •If your doctor says that alcohol is safe for you, limit how much you drink to no more than 1 drink a day for women who are not pregnant and 2 drinks a day for men.      •Contact a doctor if you have problems at work, at school, or at home due to drinking.      •Get help right away if you have thoughts about hurting yourself or others.      This information is not intended to replace advice given to you by your health care provider. Make sure you discuss any questions you have with your health care provider.      Document Revised: 05/20/2020 Document Reviewed: 04/09/2019    Elsevier Patient Education © 2021 [employer]

## 2022-02-16 NOTE — ED PROVIDER NOTE - NSICDXPASTMEDICALHX_GEN_ALL_CORE_FT
PAST MEDICAL HISTORY:  Cognitive impairment     Depression     ETOH abuse     Hypertension, unspecified type     Hyponatremia

## 2022-02-16 NOTE — ED ADULT NURSE REASSESSMENT NOTE - NS ED NURSE REASSESS COMMENT FT1
pt resting in the stretcher sleeping intermittently, plan of care explained, pt awaiting reassessment in am when sidra up, no c/o expressed at this time, ambulated to bathroom with standby assist.

## 2022-02-16 NOTE — ED ADULT TRIAGE NOTE - CHIEF COMPLAINT QUOTE
Pt presents with EMS and PD ([id number]). Pt states she has been depressed lately, denies SI/HI.  States she drinks daily, drank wine and vodka tonight.  As per PD, the daughter called and stated that her mom has been sleeping a lot and might have taken pills tonight.  Pt denies taking any medication since taking her BP medication this am.  States she took the booster on Tuesday and has been lethargic since then.  Pt compliant and A&Ox3. Placed closed to nursing station.

## 2022-02-16 NOTE — ED PROVIDER NOTE - PATIENT PORTAL LINK FT
You can access the FollowMyHealth Patient Portal offered by [facility] by registering at the following website: [email or web address]. By joining [employer]’s FollowMyHealth portal, you will also be able to view your health information using other applications (apps) compatible with our system.

## 2022-02-16 NOTE — ED PROVIDER NOTE - OBJECTIVE STATEMENT
70 yo female biba for "lethargy" according to daughter. Pt drinks daily, drank today, had covid booster vaccine and since has not been feeling well.

## 2022-02-16 NOTE — ED PROVIDER NOTE - PHYSICAL EXAMINATION
Gen:  Well appearing in NAD, aob  Head:  NC/AT  HEENT: pupils perrl,no pharyngeal erythema, uvula midline  Cardiac: S1S2, RRR  Abd: Soft, non tender  Resp: No distress, CTA   musculoskeletal:: no deformities, no swelling, strength +5/+5  Skin: warm and dry as visualized, no rashes  Neuro: AMBROSIO, , aao x 4  Psych:alert, cooperative, appropriate mood and affect for situation

## 2022-02-16 NOTE — ED ADULT NURSE NOTE - CHIEF COMPLAINT QUOTE
Pt presents with EMS and PD ([id number]). Pt states she has been depressed lately, denies SI/HI.  States she drinks daily, drank wine and vodka tonight.  As per PD, the daughter called and stated that her mom has been sleeping a lot and might have taken pills tonight.  Pt denies taking any medication since taking her BP medication this am.  States she took the booster on Tuesday and has been lethargic since then.  Pt compliant and A&Ox3. Placed closed to nursing station.

## 2022-02-16 NOTE — ED PROVIDER NOTE - CLINICAL SUMMARY MEDICAL DECISION MAKING FREE TEXT BOX
pt with aob, likely lethargy related to etoh intoxication, no visible signs of trauma will get labs, allow pt to sober then re evaluate for need to speak to psych or sw or be dc

## 2022-02-17 NOTE — BRIEF OPERATIVE NOTE - ESTIMATED BLOOD LOSS
1 Protopic Counseling: Patient may experience a mild burning sensation during topical application. Protopic is not approved in children less than 2 years of age. There have been case reports of hematologic and skin malignancies in patients using topical calcineurin inhibitors although causality is questionable.

## 2022-02-28 NOTE — ED ADULT NURSE NOTE - NSSUHOSCREENINGYN_ED_ALL_ED
Problem: Non-Pressure Injury Wound  Goal: # No deterioration in wound  Outcome: Outcome Met, Continue evaluating goal progress toward completion  Note: Continue Fluidized sand bed. Continue wound care daily      Yes - the patient is able to be screened

## 2024-01-14 ENCOUNTER — INPATIENT (INPATIENT)
Facility: HOSPITAL | Age: 74
LOS: 3 days | Discharge: NURSING, REHAB OR LONG-TERM CARE | DRG: 552 | End: 2024-01-18
Attending: FAMILY MEDICINE | Admitting: STUDENT IN AN ORGANIZED HEALTH CARE EDUCATION/TRAINING PROGRAM
Payer: MEDICARE

## 2024-01-14 VITALS — HEIGHT: 66 IN | WEIGHT: 179.9 LBS

## 2024-01-14 DIAGNOSIS — Z98.890 OTHER SPECIFIED POSTPROCEDURAL STATES: Chronic | ICD-10-CM

## 2024-01-14 LAB
ADD ON TEST-SPECIMEN IN LAB: SIGNIFICANT CHANGE UP
ADD ON TEST-SPECIMEN IN LAB: SIGNIFICANT CHANGE UP
ALBUMIN SERPL ELPH-MCNC: 3.3 G/DL — SIGNIFICANT CHANGE UP (ref 3.3–5)
ALBUMIN SERPL ELPH-MCNC: 3.3 G/DL — SIGNIFICANT CHANGE UP (ref 3.3–5)
ALP SERPL-CCNC: 77 U/L — SIGNIFICANT CHANGE UP (ref 40–120)
ALP SERPL-CCNC: 77 U/L — SIGNIFICANT CHANGE UP (ref 40–120)
ALT FLD-CCNC: 37 U/L — SIGNIFICANT CHANGE UP (ref 12–78)
ALT FLD-CCNC: 37 U/L — SIGNIFICANT CHANGE UP (ref 12–78)
ANION GAP SERPL CALC-SCNC: 9 MMOL/L — SIGNIFICANT CHANGE UP (ref 5–17)
ANION GAP SERPL CALC-SCNC: 9 MMOL/L — SIGNIFICANT CHANGE UP (ref 5–17)
APTT BLD: 27.5 SEC — SIGNIFICANT CHANGE UP (ref 24.5–35.6)
APTT BLD: 27.5 SEC — SIGNIFICANT CHANGE UP (ref 24.5–35.6)
AST SERPL-CCNC: 41 U/L — HIGH (ref 15–37)
AST SERPL-CCNC: 41 U/L — HIGH (ref 15–37)
BASOPHILS # BLD AUTO: 0.08 K/UL — SIGNIFICANT CHANGE UP (ref 0–0.2)
BASOPHILS # BLD AUTO: 0.08 K/UL — SIGNIFICANT CHANGE UP (ref 0–0.2)
BASOPHILS NFR BLD AUTO: 1 % — SIGNIFICANT CHANGE UP (ref 0–2)
BASOPHILS NFR BLD AUTO: 1 % — SIGNIFICANT CHANGE UP (ref 0–2)
BILIRUB SERPL-MCNC: 1.5 MG/DL — HIGH (ref 0.2–1.2)
BILIRUB SERPL-MCNC: 1.5 MG/DL — HIGH (ref 0.2–1.2)
BUN SERPL-MCNC: 11 MG/DL — SIGNIFICANT CHANGE UP (ref 7–23)
BUN SERPL-MCNC: 11 MG/DL — SIGNIFICANT CHANGE UP (ref 7–23)
CALCIUM SERPL-MCNC: 8.8 MG/DL — SIGNIFICANT CHANGE UP (ref 8.5–10.1)
CALCIUM SERPL-MCNC: 8.8 MG/DL — SIGNIFICANT CHANGE UP (ref 8.5–10.1)
CHLORIDE SERPL-SCNC: 102 MMOL/L — SIGNIFICANT CHANGE UP (ref 96–108)
CHLORIDE SERPL-SCNC: 102 MMOL/L — SIGNIFICANT CHANGE UP (ref 96–108)
CO2 SERPL-SCNC: 23 MMOL/L — SIGNIFICANT CHANGE UP (ref 22–31)
CO2 SERPL-SCNC: 23 MMOL/L — SIGNIFICANT CHANGE UP (ref 22–31)
CREAT SERPL-MCNC: 0.59 MG/DL — SIGNIFICANT CHANGE UP (ref 0.5–1.3)
CREAT SERPL-MCNC: 0.59 MG/DL — SIGNIFICANT CHANGE UP (ref 0.5–1.3)
EGFR: 96 ML/MIN/1.73M2 — SIGNIFICANT CHANGE UP
EGFR: 96 ML/MIN/1.73M2 — SIGNIFICANT CHANGE UP
EOSINOPHIL # BLD AUTO: 0.08 K/UL — SIGNIFICANT CHANGE UP (ref 0–0.5)
EOSINOPHIL # BLD AUTO: 0.08 K/UL — SIGNIFICANT CHANGE UP (ref 0–0.5)
EOSINOPHIL NFR BLD AUTO: 1 % — SIGNIFICANT CHANGE UP (ref 0–6)
EOSINOPHIL NFR BLD AUTO: 1 % — SIGNIFICANT CHANGE UP (ref 0–6)
GLUCOSE SERPL-MCNC: 85 MG/DL — SIGNIFICANT CHANGE UP (ref 70–99)
GLUCOSE SERPL-MCNC: 85 MG/DL — SIGNIFICANT CHANGE UP (ref 70–99)
HCT VFR BLD CALC: 39.7 % — SIGNIFICANT CHANGE UP (ref 34.5–45)
HCT VFR BLD CALC: 39.7 % — SIGNIFICANT CHANGE UP (ref 34.5–45)
HGB BLD-MCNC: 14.2 G/DL — SIGNIFICANT CHANGE UP (ref 11.5–15.5)
HGB BLD-MCNC: 14.2 G/DL — SIGNIFICANT CHANGE UP (ref 11.5–15.5)
INR BLD: 1.03 RATIO — SIGNIFICANT CHANGE UP (ref 0.85–1.18)
INR BLD: 1.03 RATIO — SIGNIFICANT CHANGE UP (ref 0.85–1.18)
LYMPHOCYTES # BLD AUTO: 0.57 K/UL — LOW (ref 1–3.3)
LYMPHOCYTES # BLD AUTO: 0.57 K/UL — LOW (ref 1–3.3)
LYMPHOCYTES # BLD AUTO: 7 % — LOW (ref 13–44)
LYMPHOCYTES # BLD AUTO: 7 % — LOW (ref 13–44)
MANUAL SMEAR VERIFICATION: SIGNIFICANT CHANGE UP
MANUAL SMEAR VERIFICATION: SIGNIFICANT CHANGE UP
MCHC RBC-ENTMCNC: 34.8 PG — HIGH (ref 27–34)
MCHC RBC-ENTMCNC: 34.8 PG — HIGH (ref 27–34)
MCHC RBC-ENTMCNC: 35.8 GM/DL — SIGNIFICANT CHANGE UP (ref 32–36)
MCHC RBC-ENTMCNC: 35.8 GM/DL — SIGNIFICANT CHANGE UP (ref 32–36)
MCV RBC AUTO: 97.3 FL — SIGNIFICANT CHANGE UP (ref 80–100)
MCV RBC AUTO: 97.3 FL — SIGNIFICANT CHANGE UP (ref 80–100)
MONOCYTES # BLD AUTO: 0.49 K/UL — SIGNIFICANT CHANGE UP (ref 0–0.9)
MONOCYTES # BLD AUTO: 0.49 K/UL — SIGNIFICANT CHANGE UP (ref 0–0.9)
MONOCYTES NFR BLD AUTO: 6 % — SIGNIFICANT CHANGE UP (ref 2–14)
MONOCYTES NFR BLD AUTO: 6 % — SIGNIFICANT CHANGE UP (ref 2–14)
NEUTROPHILS # BLD AUTO: 6.93 K/UL — SIGNIFICANT CHANGE UP (ref 1.8–7.4)
NEUTROPHILS # BLD AUTO: 6.93 K/UL — SIGNIFICANT CHANGE UP (ref 1.8–7.4)
NEUTROPHILS NFR BLD AUTO: 85 % — HIGH (ref 43–77)
NEUTROPHILS NFR BLD AUTO: 85 % — HIGH (ref 43–77)
NRBC # BLD: 0 /100 WBCS — SIGNIFICANT CHANGE UP (ref 0–0)
NRBC # BLD: 0 /100 WBCS — SIGNIFICANT CHANGE UP (ref 0–0)
NRBC # BLD: SIGNIFICANT CHANGE UP /100 WBCS (ref 0–0)
NRBC # BLD: SIGNIFICANT CHANGE UP /100 WBCS (ref 0–0)
PLAT MORPH BLD: NORMAL — SIGNIFICANT CHANGE UP
PLAT MORPH BLD: NORMAL — SIGNIFICANT CHANGE UP
PLATELET # BLD AUTO: 254 K/UL — SIGNIFICANT CHANGE UP (ref 150–400)
PLATELET # BLD AUTO: 254 K/UL — SIGNIFICANT CHANGE UP (ref 150–400)
POTASSIUM SERPL-MCNC: 4.5 MMOL/L — SIGNIFICANT CHANGE UP (ref 3.5–5.3)
POTASSIUM SERPL-MCNC: 4.5 MMOL/L — SIGNIFICANT CHANGE UP (ref 3.5–5.3)
POTASSIUM SERPL-SCNC: 4.5 MMOL/L — SIGNIFICANT CHANGE UP (ref 3.5–5.3)
POTASSIUM SERPL-SCNC: 4.5 MMOL/L — SIGNIFICANT CHANGE UP (ref 3.5–5.3)
PROT SERPL-MCNC: 7 GM/DL — SIGNIFICANT CHANGE UP (ref 6–8.3)
PROT SERPL-MCNC: 7 GM/DL — SIGNIFICANT CHANGE UP (ref 6–8.3)
PROTHROM AB SERPL-ACNC: 11.6 SEC — SIGNIFICANT CHANGE UP (ref 9.5–13)
PROTHROM AB SERPL-ACNC: 11.6 SEC — SIGNIFICANT CHANGE UP (ref 9.5–13)
RBC # BLD: 4.08 M/UL — SIGNIFICANT CHANGE UP (ref 3.8–5.2)
RBC # BLD: 4.08 M/UL — SIGNIFICANT CHANGE UP (ref 3.8–5.2)
RBC # FLD: 13.2 % — SIGNIFICANT CHANGE UP (ref 10.3–14.5)
RBC # FLD: 13.2 % — SIGNIFICANT CHANGE UP (ref 10.3–14.5)
RBC BLD AUTO: NORMAL — SIGNIFICANT CHANGE UP
RBC BLD AUTO: NORMAL — SIGNIFICANT CHANGE UP
SODIUM SERPL-SCNC: 134 MMOL/L — LOW (ref 135–145)
SODIUM SERPL-SCNC: 134 MMOL/L — LOW (ref 135–145)
WBC # BLD: 8.15 K/UL — SIGNIFICANT CHANGE UP (ref 3.8–10.5)
WBC # BLD: 8.15 K/UL — SIGNIFICANT CHANGE UP (ref 3.8–10.5)
WBC # FLD AUTO: 8.15 K/UL — SIGNIFICANT CHANGE UP (ref 3.8–10.5)
WBC # FLD AUTO: 8.15 K/UL — SIGNIFICANT CHANGE UP (ref 3.8–10.5)

## 2024-01-14 PROCEDURE — 72131 CT LUMBAR SPINE W/O DYE: CPT | Mod: 26,MA

## 2024-01-14 PROCEDURE — 70450 CT HEAD/BRAIN W/O DYE: CPT | Mod: 26,MA

## 2024-01-14 PROCEDURE — 73502 X-RAY EXAM HIP UNI 2-3 VIEWS: CPT | Mod: 26,RT

## 2024-01-14 PROCEDURE — 99285 EMERGENCY DEPT VISIT HI MDM: CPT

## 2024-01-14 PROCEDURE — 73552 X-RAY EXAM OF FEMUR 2/>: CPT | Mod: 26,RT

## 2024-01-14 RX ORDER — KETOROLAC TROMETHAMINE 30 MG/ML
30 SYRINGE (ML) INJECTION ONCE
Refills: 0 | Status: DISCONTINUED | OUTPATIENT
Start: 2024-01-14 | End: 2024-01-14

## 2024-01-14 RX ORDER — SODIUM CHLORIDE 9 MG/ML
1000 INJECTION INTRAMUSCULAR; INTRAVENOUS; SUBCUTANEOUS ONCE
Refills: 0 | Status: COMPLETED | OUTPATIENT
Start: 2024-01-14 | End: 2024-01-14

## 2024-01-14 RX ORDER — METOPROLOL TARTRATE 50 MG
25 TABLET ORAL ONCE
Refills: 0 | Status: COMPLETED | OUTPATIENT
Start: 2024-01-14 | End: 2024-01-14

## 2024-01-14 RX ORDER — MORPHINE SULFATE 50 MG/1
4 CAPSULE, EXTENDED RELEASE ORAL ONCE
Refills: 0 | Status: DISCONTINUED | OUTPATIENT
Start: 2024-01-14 | End: 2024-01-14

## 2024-01-14 RX ADMIN — Medication 25 MILLIGRAM(S): at 23:38

## 2024-01-14 RX ADMIN — MORPHINE SULFATE 4 MILLIGRAM(S): 50 CAPSULE, EXTENDED RELEASE ORAL at 21:15

## 2024-01-14 RX ADMIN — MORPHINE SULFATE 4 MILLIGRAM(S): 50 CAPSULE, EXTENDED RELEASE ORAL at 20:28

## 2024-01-14 RX ADMIN — SODIUM CHLORIDE 1000 MILLILITER(S): 9 INJECTION INTRAMUSCULAR; INTRAVENOUS; SUBCUTANEOUS at 21:40

## 2024-01-14 NOTE — ED ADULT NURSE NOTE - NSFALLRISKINTERV_ED_ALL_ED
Assistance OOB with selected safe patient handling equipment if applicable/Assistance with ambulation/Communicate fall risk and risk factors to all staff, patient, and family/Monitor gait and stability/Provide visual cue: yellow wristband, yellow gown, etc/Reinforce activity limits and safety measures with patient and family/Call bell, personal items and telephone in reach/Instruct patient to call for assistance before getting out of bed/chair/stretcher/Non-slip footwear applied when patient is off stretcher/[place] to call system/Physically safe environment - no spills, clutter or unnecessary equipment/Purposeful Proactive Rounding/Room/bathroom lighting operational, light cord in reach Assistance OOB with selected safe patient handling equipment if applicable/Assistance with ambulation/Communicate fall risk and risk factors to all staff, patient, and family/Monitor gait and stability/Provide visual cue: yellow wristband, yellow gown, etc/Reinforce activity limits and safety measures with patient and family/Call bell, personal items and telephone in reach/Instruct patient to call for assistance before getting out of bed/chair/stretcher/Non-slip footwear applied when patient is off stretcher/[place] to call system/Physically safe environment - no spills, clutter or unnecessary equipment/Purposeful Proactive Rounding/Room/bathroom lighting operational, light cord in reach Assistance OOB with selected safe patient handling equipment if applicable/Assistance with ambulation/Communicate fall risk and risk factors to all staff, patient, and family/Monitor gait and stability/Provide visual cue: yellow wristband, yellow gown, etc/Reinforce activity limits and safety measures with patient and family/Call bell, personal items and telephone in reach/Instruct patient to call for assistance before getting out of bed/chair/stretcher/Non-slip footwear applied when patient is off stretcher/[place] to call system/Physically safe environment - no spills, clutter or unnecessary equipment/Purposeful Proactive Rounding/Room/bathroom lighting operational, light cord in reach

## 2024-01-14 NOTE — ED ADULT NURSE REASSESSMENT NOTE - NS ED NURSE REASSESS COMMENT FT1
Daughter said pt has not been taking her medication (Metoprolol, Levothyroxine, and others) for the past 2 days. Medication information given to provider. RN assisted pt with bedpan to void. Will continue to monitor.

## 2024-01-14 NOTE — ED PROVIDER NOTE - PROGRESS NOTE DETAILS
Abel Michael for ED attending, Dr. Felix: pt noted to be mildly tremulous, reportedly not drinking water at home, only etoh. Pt likely in alcohol withdrawal, will order IV fluids, give dose of ativan pt with L2L3 compressionfracture, pt does not remember falling will get ct head and admit to medicine for pain control as pt cannot walk and lives alone cannot go home and is requiring iv pain medicaiton SANDRA Spencer DO pt with L2L3 compressionfracture, pt does not remember falling will get ct head and admit to medicine for pain control as pt cannot walk and lives alone cannot go home and is requiring iv pain medicaiton SANDRA Spenecr DO

## 2024-01-14 NOTE — ED STATDOCS - NS ED SCRIBE STATEMENT
Occupational Therapy Evaluation    Referred by: Phillip Lamas MD; Medical Diagnosis (from order):    Diagnosis Information      Diagnosis    299.00 (ICD-9-CM) - F84.0 (ICD-10-CM) - Autism spectrum disorder              Visit: 1    Visit Type: Initial Evaluation  Treatment diagnosis:   impaired motor function/performance/coordination, impaired age-appropriate social participation and impaired developmental milestones  Date of onset: 2018  Chart reviewed at time of initial evaluation (relevant co-morbidities, allergies, tests and medications listed): ALLERGIES:  Not on File    Medical Hx:Global developmental delay  Developmental regression  Developmental feeding disorder   Mixed Receptive Expressive Language Disorder    SUBJECTIVE                                                                                                             Present and reporting subjective information: mother  Tim came into eval with mother and sister .. Used  on video for parent..Sibling also interpreted at times.  Tim followed his sister and allowed her to assist him with toys.  He used nice eye contact with sister and allowed [place] assist.. Tim had a difficult time staying seated for tasks.  He wanted to play with ball or spin the OT rolling stool.  Mom is willing to try staying out of room next visit to see if he will follow directions when she not in room.   Pain / Symptoms:  - denies pain/symptoms  Function:  - Activities reported as painful or challenging: participate in play/play participation, age-appropriate milestones or skills, keeping up with peers and community safety/poor safety awareness  - Prior level of function: referred to therapy due to inability to engage in age appropriate daily activities  Patient Goals: keep up with peers    Prior treatment: birth to 4/early intervention, outpatient speech  Home Environment:   Patient lives with parent or legal guardian. sibling(s)  Assistance  available: consistent  School or Day Care: part time school  Feels safe at home/work/school.  2 or more falls or an unexplained fall with injury in the last year:  No    OBJECTIVE                                                                                                                    Observation:   Behavior:  Tim did repeat some words such as colors . He matched all colors 100% .  He did not recall names of letters this date.  Mom reports he knows only a few letters.  He does not know numbers.  Tim like to line toys up and spin toys per mom..Observed him lining up blocks and spinning OT stool.  He does need re-directional cues freq as difficulty staying seated .    Muscle Tone:  LUE: low tone  RUE: low tone  Range of Motion   WNL: LUE, RUE   Hand Dominance: right-handed;     Movement:  Pre-School Age (3-4 Years):  -cuts with scissors: unable to complete  -unbuttons 3 buttons: unable to complete  -laces 3 holes: unable to complete  -traces a horizontal line: unable to complete      Activities of Daily Living:   Dressing:    - Shirt/Coat:        • Don: maximal assist    - Pants:       • Don: maximal assist   - Socks:       • Don: maximal assist   - Shoes:       • Don: maximal assist  Grooming:    - Hair Care: maximal assist    - Washing hands: maximal assist    - Brushing teeth: maximal assist    Skills/Executive Function:    - Attention: distractability    Sensory:    - Auditory: over reacts to everyday noises   - Tactile: avoids messy play   - Oral/Gustatory: avoids certain textures of foods   - Proprioception: difficulty sitting still at school, [place] time, meals, craves activities that involve deep pressure (i.e. hugs, enclosed space) and demonstrates poor body awareness    Vision:   - does not wear glasses        Outcome/Assessments  Unable to follow formal assessment tool    TREATMENT                                                                                                                 initial evaluation completed    Skilled input: verbal instruction/cues    Home Exercise Program: Family provided: TBD       ASSESSMENT                                                                                                            impaired motor function/performance/coordination, impaired age-appropriate social participation and impaired developmental milestones   based on evaluation above and has reported functional limitation above.  Tim was very attentive to older sister with play skills this date.. He imitated her and allowed her to do [place] assist for scribbles.. Tim struggled with attention and therefore no formal assessment tool could be used.. Parent report with video   and  clinical observation were used for eval.  Tim used R hand to grasp items with a digital supinated grasp , however weak grasp noted with writing tool.  He did try to imitate vertical line and scribble only.  He did not lace any holes and has not been exposed to scissors yet. He showed nice eye contact this date with family and OT. Mom reports he does not like loud familiar noises such as , vacuum  or .  He will go to a room and close door to avoid.  He loves baths , but muñoz snot like hair cuts .  Mom reports he likes to cuddle.  Tim is not talking . At home he will point to items or mom has few pictures that he will point to.  He is not toilet trained.  Tim is delayed with all FM skills and socialization. He is delayed with ADL's as well.   Mom is concerned with feeding issues due to being picky eater.    Pain/symptoms after session: 0  Prognosis: patient will benefit from skilled therapy  Habilitative potential is: good  Clinical decision making: Complex/High - Patient has several limitations (5+), comorbidities and/or complexities, as noted in comprehensive assessment above, that impact their occupational profile.  Resulting in multiple treatment options and  significant task modification consistent with high clinical decision making complexity.  Child/Caregiver Education:   Who will be receiving education: patient's parent(s)  Are they ready to learn: yes  Preferred learning style: written  Barriers to learning: language  Results of above outlined education: Verbalizes understanding      PLAN                                                                                                                         The following skilled interventions to be implemented to achieve goals listed below:  Activities of Daily Living/Self Care (67553)  Therapeutic Exercise (61212)  Sensory Integration (06211)    Frequency / Duration: 1 times per week tapering as patient progresses for an estimated total of 12 visits for 12 weeks    Patient's parent(s) involved in and agreed to plan of care and goals.  Patient given attendance policy at time of initial evaluation.  Suggestions for next session as indicated: Progress per plan of care        GOALS                                                                                                                           Long Term Goals: to be met by end of plan of care  1. Patient donns shoes and socks with minimal assistance in 4 trials for increased participation in age approrpriate self-care abilities. .   Goal Attainment Scale (GAS)    -2: *0    -1: 2      0: per goal written above    +1: 6    +2: 8        GAS Key        -2=Much less than expected outcome (baseline); -1=Less than expected outcome (progressing);          0=Patient achieves expected outcome after intervention (goal, set at evaluation); +1=Better than          expected outcome; +2=Much better than expected outcome    Importance: 3  Difficulty: 3  Weight: 9    Baseline: -2  2. Patient uses pronated digital grasp  on writing utensil with minimal assistance in 4 trials to demonstrate motor control for prewriting/handwriting activities. .   Goal Attainment Scale (GAS)    -2:  0    -1: 2      0: per goal written above    +1: 6    +2: 8    Importance: 3  Difficulty: 3  Weight: 9    Baseline: -2  3. Patient manipulates loop scissors with minimal assistance in 4 trials to demonstrate pre-scissors skills needed for play and academic tasks.   Goal Attainment Scale (GAS)    -2: 0    -1: 2      0: per goal written above    +1: 6    +2: 8    Importance: 3  Difficulty: 3  Weight: 9    Baseline: -2  GAS T Score Calculations:    Baseline: 22.61      Therapy procedure time and total treatment time can be found documented on the Time Entry flowsheet    Patient Name: Tim Anaya   MRN: [id number]  Diagnosis:Autism  ICD10 Code:F 84.0  General:    [x] Evaluate and Treat      [] PT      [x] OT      [] ST  Frequency:1x/week   Duration: 45 min     Pediatric Rehab Specialties:  [] Balance/Vestibular   [] Aquatic Therapy   [] Sports Medicine   [x] Sensory Integration  [] Neurodevelopment Treatment   [] Swallow/Feeding   [] Cognition  [] CBIT (Comprehensive Behavioral Intervention for TICs)     Physician's Orders and (Re) certification of need  *Please sign and return to fax number listed above*   *Signature required for insurance purposes*    Additional Instructions/Precautions/Contraindications:     ____________________________________________________________  ____________________________________________________________  ____________________________________________________________  Phillip Lamas MD   Physician Signature:________________________________Date:__________              Attending

## 2024-01-14 NOTE — ED ADULT NURSE NOTE - CAS EDP DISCH DISPOSITION ADMI
[address] [place] [address] [address] [place] [address] [address] [place] [address]

## 2024-01-14 NOTE — ED ADULT NURSE REASSESSMENT NOTE - NS ED NURSE REASSESS COMMENT FT1
Call daughter with updater Laurence [phone] or brother - Betito [phone] Call daughter with updater Laurence [phone] or brother - Betito [phone]

## 2024-01-14 NOTE — ED PROVIDER NOTE - PHYSICAL EXAMINATION
Gen:  Well appearing in NAD  Head:  NC/AT  HEENT: pupils perrl,no pharyngeal erythema, uvula midline  Cardiac: S1S2, RRR  Abd: Soft, non tender  Resp: No distress, CTA   musculoskeletal:: no deformities, no swelling, strength +5/+5, + ttp right posterior illiac crest and right paralumbar ttp  Skin: warm and dry as visualized, no rashes  Neuro: AMBROSIO, aao x 4  Psych:alert, cooperative, appropriate mood and affect for situation Gen:  Well appearing in NAD  Head:  NC/AT  HEENT: pupils perrl,no pharyngeal erythema, uvula midline  Cardiac: S1S2, RRR  Abd: Soft, non tender  Resp: No distress, CTA   musculoskeletal:: no deformities, no swelling, strength +5/+5, + ttp right posterior illiac crest and right paralumbar ttp  Skin: warm and dry as visualized, no rashes  Neuro: AMBROSIO, aao x 4, mildly tremulous   Psych:alert, cooperative, appropriate mood and affect for situation

## 2024-01-14 NOTE — ED ADULT TRIAGE NOTE - CHIEF COMPLAINT QUOTE
Pt presents to ED in wheelchair c/o fall. Pt states her foot got caught and she tripped and fell on her right hip. Pt w/ r hip pain. Pt denies headstrike. NKDA.

## 2024-01-14 NOTE — ED PROVIDER NOTE - NS ED MD DISPO DIVISION
[facility]

## 2024-01-14 NOTE — ED PROVIDER NOTE - CLINICAL SUMMARY MEDICAL DECISION MAKING FREE TEXT BOX
pt with right lower back/buttock pain radiating into her right anterior thigh, is having difficulty ambulating without assistance, XR do not show any acute fractures to hip will obtain CT L/S spine, medicate for pain, reassess.

## 2024-01-14 NOTE — ED ADULT NURSE NOTE - OBJECTIVE STATEMENT
Pt BIB mom from home to ED in wheelchair c/o fall. Pt states her foot got caught and she tripped and fell on her right hip. Pt denies headstrike. Per daughter, pt has a drinking problem, which is the reason for multiple fall. Pt agrees with daughter's statement and said she has it unde control. Pt had unwitness fall on Wed and Fri last week. Per daughter, pt has not been drinking much water because she did not want to go to the bathroom d/t hip pain. Pain 10/10. NKA . PMHx Depression Pt denies self harm. Pt BIB mom from home to ED in wheelchair c/o fall. Pt states her foot got caught and she tripped and fell on her right hip. Pt denies headstrike. Per daughter, pt has a drinking problem, which is the reason for multiple fall. Pt drink twice daily. Pt agrees with daughter's statement and said she has it unde control. Pt had unwitness fall on Wed and Fri last week. Per daughter, pt has not been drinking much water because she did not want to go to the bathroom d/t hip pain. Pain 10/10. NKA . PMHx Depression Pt denies self harm.

## 2024-01-14 NOTE — ED ADULT NURSE NOTE - NS ED PATIENT SAFETY CONERN FT
Daughter said pt has drinking problem which lead to multiple fall and sometimes she is unable to visit pt who lives alone

## 2024-01-14 NOTE — ED PROVIDER NOTE - OBJECTIVE STATEMENT
72 y/o female with PMHx of presents to the ED c/o right lower lack/buttock/hip pain that started last week. Pt reports she slipped and did nearly a split, reports several days later she was getting out of recliner. Pt daily drinker, having difficulty ambulating without assistance. Pt denies head trauma, denies anticoagulation usage. 74 y/o female with PMHx of presents to the ED c/o right lower lack/buttock/hip pain that started last week. Pt reports she slipped and did nearly a split, reports several days later she was getting out of recliner. Pt daily drinker, having difficulty ambulating without assistance. Pt denies head trauma, denies anticoagulation usage.

## 2024-01-14 NOTE — ED STATDOCS - PROGRESS NOTE DETAILS
Abel Hays for attending Dr. Gandara  74 y/o female with PMHx of EtOH abuse, hyponatremia, cognitive impairment, HTN, HLD; presents to the ED s/p x2 falls, 1/10 fell onto R knee which was improving but on 1/12 had another fall, after her foot got caught and she tripped, fell on R hip. Patient c/o R hip pain, has difficulty standing upright and unable to ambulate. Per daughter at bedside, patient lives alone, now is unable to do daily tasks by herself and requires assistance moving around, has not been eating or hydrating well. Reported EtOH use daily last consumption yesterday, a bottle of wine. Patient x2 falls, unable to stand on her own or self-ambulate. Sending to the MAIN for further evaluation. Abel Hays for attending Dr. Gandara  74 y/o female with PMHx of EtOH abuse, hyponatremia, cognitive impairment, HTN, HLD; presents to the ED s/p x2 falls, 1/10 fell onto R knee which was improving but on 1/12 had another fall, after her foot got caught and she tripped, fell on R hip. Patient c/o R hip pain, has difficulty standing upright and unable to ambulate. Per daughter at bedside, patient lives alone, now is unable to do daily tasks by herself and requires assistance moving around, has not been eating or hydrating well. Reported EtOH use daily last consumption yesterday, a bottle of wine. Patient x2 falls, unable to stand on her own or self-ambulate. Likely needs outpatient rehab support. Labs ordered, sending to the MAIN for further evaluation as patient a fall risk. Abel Hays for attending Dr. Gandara  72 y/o female with PMHx of EtOH abuse, hyponatremia, cognitive impairment, HTN, HLD; presents to the ED s/p x2 falls, 1/10 fell onto R knee which was improving but on 1/12 had another fall, after her foot got caught and she tripped, fell on R hip. Patient c/o R hip pain, has difficulty standing upright and unable to ambulate. Per daughter at bedside, patient lives alone, now is unable to do daily tasks by herself and requires assistance moving around, has not been eating or hydrating well. Reported EtOH use daily last consumption yesterday, a bottle of wine. Patient x2 falls, unable to stand on her own or self-ambulate. Likely needs outpatient rehab support. Labs ordered, sending to the MAIN for further evaluation as patient a fall risk.

## 2024-01-15 DIAGNOSIS — S32.000A WEDGE COMPRESSION FRACTURE OF UNSPECIFIED LUMBAR VERTEBRA, INITIAL ENCOUNTER FOR CLOSED FRACTURE: ICD-10-CM

## 2024-01-15 LAB
ETHANOL SERPL-MCNC: 18 MG/DL — HIGH (ref 0–10)
ETHANOL SERPL-MCNC: 18 MG/DL — HIGH (ref 0–10)

## 2024-01-15 PROCEDURE — 72148 MRI LUMBAR SPINE W/O DYE: CPT | Mod: 26

## 2024-01-15 PROCEDURE — 97163 PT EVAL HIGH COMPLEX 45 MIN: CPT | Mod: GP

## 2024-01-15 PROCEDURE — 36415 COLL VENOUS BLD VENIPUNCTURE: CPT

## 2024-01-15 PROCEDURE — 72146 MRI CHEST SPINE W/O DYE: CPT

## 2024-01-15 PROCEDURE — 99221 1ST HOSP IP/OBS SF/LOW 40: CPT

## 2024-01-15 PROCEDURE — 73562 X-RAY EXAM OF KNEE 3: CPT | Mod: 50

## 2024-01-15 PROCEDURE — 97116 GAIT TRAINING THERAPY: CPT | Mod: GP

## 2024-01-15 PROCEDURE — 85027 COMPLETE CBC AUTOMATED: CPT

## 2024-01-15 PROCEDURE — 80048 BASIC METABOLIC PNL TOTAL CA: CPT

## 2024-01-15 PROCEDURE — 86803 HEPATITIS C AB TEST: CPT

## 2024-01-15 PROCEDURE — 72141 MRI NECK SPINE W/O DYE: CPT

## 2024-01-15 PROCEDURE — 72148 MRI LUMBAR SPINE W/O DYE: CPT

## 2024-01-15 PROCEDURE — 87635 SARS-COV-2 COVID-19 AMP PRB: CPT

## 2024-01-15 PROCEDURE — 99222 1ST HOSP IP/OBS MODERATE 55: CPT

## 2024-01-15 PROCEDURE — 72146 MRI CHEST SPINE W/O DYE: CPT | Mod: 26

## 2024-01-15 RX ORDER — ONDANSETRON 8 MG/1
4 TABLET, FILM COATED ORAL EVERY 8 HOURS
Refills: 0 | Status: DISCONTINUED | OUTPATIENT
Start: 2024-01-15 | End: 2024-01-18

## 2024-01-15 RX ORDER — INFLUENZA VIRUS VACCINE 15; 15; 15; 15 UG/.5ML; UG/.5ML; UG/.5ML; UG/.5ML
0.7 SUSPENSION INTRAMUSCULAR ONCE
Refills: 0 | Status: DISCONTINUED | OUTPATIENT
Start: 2024-01-15 | End: 2024-01-18

## 2024-01-15 RX ORDER — METOPROLOL TARTRATE 50 MG
25 TABLET ORAL
Refills: 0 | Status: DISCONTINUED | OUTPATIENT
Start: 2024-01-15 | End: 2024-01-18

## 2024-01-15 RX ORDER — LEVOTHYROXINE SODIUM 125 MCG
50 TABLET ORAL DAILY
Refills: 0 | Status: DISCONTINUED | OUTPATIENT
Start: 2024-01-15 | End: 2024-01-18

## 2024-01-15 RX ORDER — METOPROLOL TARTRATE 50 MG
25 TABLET ORAL
Refills: 0 | Status: DISCONTINUED | OUTPATIENT
Start: 2024-01-15 | End: 2024-01-15

## 2024-01-15 RX ORDER — LANOLIN ALCOHOL/MO/W.PET/CERES
3 CREAM (GRAM) TOPICAL AT BEDTIME
Refills: 0 | Status: DISCONTINUED | OUTPATIENT
Start: 2024-01-15 | End: 2024-01-18

## 2024-01-15 RX ORDER — ESCITALOPRAM OXALATE 10 MG/1
10 TABLET, FILM COATED ORAL DAILY
Refills: 0 | Status: DISCONTINUED | OUTPATIENT
Start: 2024-01-15 | End: 2024-01-18

## 2024-01-15 RX ORDER — DULOXETINE HYDROCHLORIDE 30 MG/1
1 CAPSULE, DELAYED RELEASE ORAL
Qty: 0 | Refills: 0 | DISCHARGE

## 2024-01-15 RX ORDER — ACETAMINOPHEN 500 MG
650 TABLET ORAL EVERY 6 HOURS
Refills: 0 | Status: DISCONTINUED | OUTPATIENT
Start: 2024-01-15 | End: 2024-01-18

## 2024-01-15 RX ORDER — KETOROLAC TROMETHAMINE 30 MG/ML
15 SYRINGE (ML) INJECTION ONCE
Refills: 0 | Status: DISCONTINUED | OUTPATIENT
Start: 2024-01-15 | End: 2024-01-15

## 2024-01-15 RX ORDER — LOSARTAN POTASSIUM 100 MG/1
0 TABLET, FILM COATED ORAL
Qty: 0 | Refills: 0 | DISCHARGE

## 2024-01-15 RX ORDER — ATORVASTATIN CALCIUM 80 MG/1
40 TABLET, FILM COATED ORAL AT BEDTIME
Refills: 0 | Status: DISCONTINUED | OUTPATIENT
Start: 2024-01-15 | End: 2024-01-18

## 2024-01-15 RX ORDER — MORPHINE SULFATE 50 MG/1
4 CAPSULE, EXTENDED RELEASE ORAL ONCE
Refills: 0 | Status: DISCONTINUED | OUTPATIENT
Start: 2024-01-15 | End: 2024-01-15

## 2024-01-15 RX ORDER — TRAMADOL HYDROCHLORIDE 50 MG/1
50 TABLET ORAL EVERY 4 HOURS
Refills: 0 | Status: DISCONTINUED | OUTPATIENT
Start: 2024-01-15 | End: 2024-01-18

## 2024-01-15 RX ORDER — HEPARIN SODIUM 5000 [USP'U]/ML
5000 INJECTION INTRAVENOUS; SUBCUTANEOUS EVERY 12 HOURS
Refills: 0 | Status: DISCONTINUED | OUTPATIENT
Start: 2024-01-15 | End: 2024-01-18

## 2024-01-15 RX ADMIN — Medication 15 MILLIGRAM(S): at 00:14

## 2024-01-15 RX ADMIN — Medication 25 MILLIGRAM(S): at 21:29

## 2024-01-15 RX ADMIN — MORPHINE SULFATE 4 MILLIGRAM(S): 50 CAPSULE, EXTENDED RELEASE ORAL at 04:05

## 2024-01-15 RX ADMIN — TRAMADOL HYDROCHLORIDE 50 MILLIGRAM(S): 50 TABLET ORAL at 16:50

## 2024-01-15 RX ADMIN — HEPARIN SODIUM 5000 UNIT(S): 5000 INJECTION INTRAVENOUS; SUBCUTANEOUS at 21:29

## 2024-01-15 RX ADMIN — TRAMADOL HYDROCHLORIDE 50 MILLIGRAM(S): 50 TABLET ORAL at 20:20

## 2024-01-15 RX ADMIN — TRAMADOL HYDROCHLORIDE 50 MILLIGRAM(S): 50 TABLET ORAL at 19:49

## 2024-01-15 RX ADMIN — TRAMADOL HYDROCHLORIDE 50 MILLIGRAM(S): 50 TABLET ORAL at 15:27

## 2024-01-15 RX ADMIN — HEPARIN SODIUM 5000 UNIT(S): 5000 INJECTION INTRAVENOUS; SUBCUTANEOUS at 09:28

## 2024-01-15 RX ADMIN — TRAMADOL HYDROCHLORIDE 50 MILLIGRAM(S): 50 TABLET ORAL at 11:10

## 2024-01-15 RX ADMIN — TRAMADOL HYDROCHLORIDE 50 MILLIGRAM(S): 50 TABLET ORAL at 09:28

## 2024-01-15 RX ADMIN — ATORVASTATIN CALCIUM 40 MILLIGRAM(S): 80 TABLET, FILM COATED ORAL at 21:29

## 2024-01-15 RX ADMIN — TRAMADOL HYDROCHLORIDE 50 MILLIGRAM(S): 50 TABLET ORAL at 23:57

## 2024-01-15 NOTE — H&P ADULT - ASSESSMENT
* s/p fall and L2 L3 vertebral fracture  prn analgesia  brace from home  monitor for etoh wd, will reeval in few hours    * HTN med rec to be completed     * s/p fall and L2 L3 vertebral fracture; MRI shows T12 fx also  prn analgesia  brace from home  monitor for etoh wd, will reeval in few hours    * HTN med rec to be completed    will need rehab

## 2024-01-15 NOTE — PATIENT PROFILE ADULT - FALL HARM RISK - HARM RISK INTERVENTIONS
Assistance with ambulation/Assistance OOB with selected safe patient handling equipment/Communicate Risk of Fall with Harm to all staff/Discuss with provider need for PT consult/Monitor gait and stability/Provide patient with walking aids - walker, cane, crutches/Reinforce activity limits and safety measures with patient and family/Tailored Fall Risk Interventions/Visual Cue: Yellow wristband and red socks/Bed in lowest position, wheels locked, appropriate side rails in place/Call bell, personal items and telephone in reach/Instruct patient to call for assistance before getting out of bed or chair/Non-slip footwear when patient is out of bed/[place] to call system/Physically safe environment - no spills, clutter or unnecessary equipment/Purposeful Proactive Rounding/Room/bathroom lighting operational, light cord in reach Assistance with ambulation/Assistance OOB with selected safe patient handling equipment/Communicate Risk of Fall with Harm to all staff/Discuss with provider need for PT consult/Monitor gait and stability/Provide patient with walking aids - walker, cane, crutches/Reinforce activity limits and safety measures with patient and family/Tailored Fall Risk Interventions/Visual Cue: Yellow wristband and red socks/Bed in lowest position, wheels locked, appropriate side rails in place/Call bell, personal items and telephone in reach/Instruct patient to call for assistance before getting out of bed or chair/Non-slip footwear when patient is out of bed/[place] to call system/Physically safe environment - no spills, clutter or unnecessary equipment/Purposeful Proactive Rounding/Room/bathroom lighting operational, light cord in reach Assistance with ambulation/Assistance OOB with selected safe patient handling equipment/Communicate Risk of Fall with Harm to all staff/Discuss with provider need for PT consult/Monitor gait and stability/Provide patient with walking aids - walker, cane, crutches/Reinforce activity limits and safety measures with patient and family/Tailored Fall Risk Interventions/Visual Cue: Yellow wristband and red socks/Bed in lowest position, wheels locked, appropriate side rails in place/Call bell, personal items and telephone in reach/Instruct patient to call for assistance before getting out of bed or chair/Non-slip footwear when patient is out of bed/[place] to call system/Physically safe environment - no spills, clutter or unnecessary equipment/Purposeful Proactive Rounding/Room/bathroom lighting operational, light cord in reach

## 2024-01-15 NOTE — CONSULT NOTE ADULT - SUBJECTIVE AND OBJECTIVE BOX
Neurosurgery Spine:    72 y/o female with PMHx of presents to the ED c/o right lower lack/buttock/hip pain that started last week. Pt reports she slipped and did nearly a split, reports several days later she was getting out of recliner. Pt daily drinker, having difficulty ambulating without assistance. Pt denies head trauma, denies anticoagulation usage. Pt moves all extremeties, denies saddle type anesthesia or terrence-anal anesthesia.  report regular bowel and bladder events.  Ambulation & transitions causes significant LBP limiting her mobility past couple days.    CTH neg  CTH T/L spine: chronic T12 comp fxr with retropulsion, suspect new L2 & L3 comp fxr's    PAST MEDICAL & SURGICAL HISTORY:  Hypertension, unspecified type  Depression  Cognitive impairment  Hyponatremia  ETOH abuse    History of D&C 2000  History of lumpectomy of right breast 1999    FAMILY HISTORY:  Non-contributory    Social Hx:    No Tob  No EtOH    Allergies  No Known Allergies    ROS: Pertinent positives in HPI, all other ROS were reviewed and are negative.     Review of Symptoms  	Gen: no fevers, chills, sweats, weight loss, fatigue  	Visual: no recent changes in vision  	Cardiovascular: no chest pain, no palpitations  	Respiratory: no shortness of breath, wheezing  	GI: no difficulty swallowing, no nausea, no vomiting  	: no dysuria, no increased freq, no hematuria,   	Derm: no wounds, no rashes  	Heme: no easy bleeding or bruising  	MSK: + LBP, denies extremety pain, no limb swelling              Neuro: no headache, numbness, weakness, AMS    Vital Signs Last 24 Hrs  T(C): 36.6 (15 Ulices 2024 06:23), Max: 36.8 (14 Jan 2024 17:43)  T(F): 97.9 (15 Ulices 2024 06:23), Max: 98.3 (14 Jan 2024 17:43)  HR: 82 (15 Ulices 2024 06:23) (68 - 95)  BP: 138/75 (15 Ulices 2024 06:23) (112/68 - 157/98)  BP(mean): 100 (15 Ulices 2024 01:20) (100 - 129)  RR: 17 (15 Ulices 2024 06:23) (16 - 19)  SpO2: 96% (15 Ulices 2024 06:23) (95% - 100%)    Parameters below as of 15 Ulices 2024 06:23  Patient On (Oxygen Delivery Method): room air        Labs:                        14.2   8.15  )-----------( 254      ( 14 Jan 2024 20:09 )             39.7     01-14    134<L>  |  102  |  11  ----------------------------<  85  4.5   |  23  |  0.59    Ca    8.8      14 Jan 2024 20:09    TPro  7.0  /  Alb  3.3  /  TBili  1.5<H>  /  DBili  x   /  AST  41<H>  /  ALT  37  /  AlkPhos  77  01-14    PT/INR - ( 14 Jan 2024 20:09 )   PT: 11.6 sec;   INR: 1.03 ratio    PTT - ( 14 Jan 2024 20:09 )  PTT:27.5 sec    Radiology report:  INTERPRETATION:  CLINICAL INFORMATION: Right lower back pain.    TECHNIQUE: CT of the lumbar spine was performed in bone andsoft tissue   windows with sagittal and coronal reformats. No intravenous contrast was   administered.    COMPARISON: Lumbar spine radiographs from 3/19/2021.    FINDINGS:    Bones: There are 5 lumbar type vertebral bodies. A chronic T12 vertebral   body compression fracture deformity is again seen in near vertebral   plana. A new L2 vertebral body superior endplate compression fracture   deformity is noted resulting in less than 50% loss of vertebral body   height with adjacent inflammatory change suggesting an acute fracture. A   new comminuted L3 vertebral body compression fracture deformity is seen   resulting in approximately 50% loss of vertebral body height with   adjacent inflammatory change suggesting an acute fracture. Grade 1   anterolisthesis of T11 on T12 and L2 on L3 and grade 1 retrolisthesis of   T12 on L1 is noted. Multilevel disc space narrowing is seen; mild to   moderate at L1-L2 and mild at L2-L3, L4-L5 and L5-S1. Marginal osteophyte   formation is noted throughout the lumbar spine.    Evaluation of the individual levels demonstrates the following:    T11-T12: Retropulsion of a T12 vertebral body fracture fragment resulting   in at least mild spinal canal stenosis. No significant neural foraminal   stenosis.  T12-L1: Retropulsion of a T12 vertebral body fracture fragment into   bilateral neural foramen resulting in moderate left and severe right   neural foraminal stenosis. No significant spinal canal stenosis.  L1-L2: Broad-based disc bulge resulting in mild spinal canal stenosis and   mild left and moderate right neural foraminal stenosis.  L2-L3: Broad-based disc bulge and uncovering of the disc resulting in   mild spinal canal stenosis and mild-to-moderate right neural foraminal   stenosis. No left neural foraminal stenosis.  L3-L4: Retropulsion of an L3 vertebral body fracture fragment resulting   in at least mild spinal canal stenosis and mild bilateral neural   foraminal stenosis.  L4-L5: Broad-based disc bulge resulting in mild spinal canal stenosis and   mild bilateral neural foraminal stenosis.  L5-S1: Broad-based disc bulge resulting in flattening of the ventral   thecal sac and mild left neural foraminal stenosis. No right neural   foraminal stenosis.    Soft tissues: Subsegmental atelectasis is seen in the visualized lung   bases. The retroperitoneum and paravertebral muscles are unremarkable.    IMPRESSION:  L2 and L3 vertebral body compression fracture deformities likely acute in   age.  T12 is chronic and observed again with retropulsion.      Physical Exam:  Constitutional: Awake / alert  HEENT: PERRLA, EOMI  Neck: Supple  Respiratory: Breath sounds are clear bilaterally  Cardiovascular: S1 and S2, regular rhythm  Gastrointestinal: Soft, NT/ND  Extremities:  no edema  Musculoskeletal: no abnormal movements  Skin: No rashes    Neurological Exam:  HF: A x O x 3, follows commands, normal affect  CN: PERRL, EOMI, no NLFD, tongue midline  Motor: Strength 5/5 in all 4 ext, normal bulk and tone  Sens: Intact to light touch  Reflexes: Symmetric and normal, no clonus, no Espinosa's   Coord:  No FNFA, dysmetria, JODIE intact   Gait/Balance: Cannot test    A/P:  72 y/o female with PMHx of presents to the ED c/o right lower lack/buttock/hip pain that started last week. Pt reports she slipped and did nearly a split, reports several days later she was getting out of recliner. Pt daily drinker, having difficulty ambulating without assistance. Pt denies head trauma, denies anticoagulation usage. Pt moves all extremeties, denies saddle type anesthesia or terrence-anal anesthesia.  report regular bowel and bladder events.  Ambulation & transitions causes significant LBP limiting her mobility past couple days.    - No acute neurosurgical intervention warranted  - Bedrest for now, HOB upto 20 degrees  - Aim for MRI of T/L spine studies  - Pt needs TLSO brace - call [place] [facility] in AM  - CT spine imaging reviewed  - CTH negative  - DVT ppx includes SCDs  - Bedrest for now with spine precautions until MRI of T/L spine reviewed  - care plan d/w and determined by attending - Dr. Matute - attending neurosurgeon           Neurosurgery Spine:    74 y/o female with PMHx of presents to the ED c/o right lower lack/buttock/hip pain that started last week. Pt reports she slipped and did nearly a split, reports several days later she was getting out of recliner. Pt daily drinker, having difficulty ambulating without assistance. Pt denies head trauma, denies anticoagulation usage. Pt moves all extremeties, denies saddle type anesthesia or terrence-anal anesthesia.  report regular bowel and bladder events.  Ambulation & transitions causes significant LBP limiting her mobility past couple days.    CTH neg  CTH T/L spine: chronic T12 comp fxr with retropulsion, suspect new L2 & L3 comp fxr's    PAST MEDICAL & SURGICAL HISTORY:  Hypertension, unspecified type  Depression  Cognitive impairment  Hyponatremia  ETOH abuse    History of D&C 2000  History of lumpectomy of right breast 1999    FAMILY HISTORY:  Non-contributory    Social Hx:    No Tob  No EtOH    Allergies  No Known Allergies    ROS: Pertinent positives in HPI, all other ROS were reviewed and are negative.     Review of Symptoms  	Gen: no fevers, chills, sweats, weight loss, fatigue  	Visual: no recent changes in vision  	Cardiovascular: no chest pain, no palpitations  	Respiratory: no shortness of breath, wheezing  	GI: no difficulty swallowing, no nausea, no vomiting  	: no dysuria, no increased freq, no hematuria,   	Derm: no wounds, no rashes  	Heme: no easy bleeding or bruising  	MSK: + LBP, denies extremety pain, no limb swelling              Neuro: no headache, numbness, weakness, AMS    Vital Signs Last 24 Hrs  T(C): 36.6 (15 Ulices 2024 06:23), Max: 36.8 (14 Jan 2024 17:43)  T(F): 97.9 (15 Ulices 2024 06:23), Max: 98.3 (14 Jan 2024 17:43)  HR: 82 (15 Ulices 2024 06:23) (68 - 95)  BP: 138/75 (15 Ulices 2024 06:23) (112/68 - 157/98)  BP(mean): 100 (15 Ulices 2024 01:20) (100 - 129)  RR: 17 (15 Ulices 2024 06:23) (16 - 19)  SpO2: 96% (15 Ulices 2024 06:23) (95% - 100%)    Parameters below as of 15 Ulices 2024 06:23  Patient On (Oxygen Delivery Method): room air        Labs:                        14.2   8.15  )-----------( 254      ( 14 Jan 2024 20:09 )             39.7     01-14    134<L>  |  102  |  11  ----------------------------<  85  4.5   |  23  |  0.59    Ca    8.8      14 Jan 2024 20:09    TPro  7.0  /  Alb  3.3  /  TBili  1.5<H>  /  DBili  x   /  AST  41<H>  /  ALT  37  /  AlkPhos  77  01-14    PT/INR - ( 14 Jan 2024 20:09 )   PT: 11.6 sec;   INR: 1.03 ratio    PTT - ( 14 Jan 2024 20:09 )  PTT:27.5 sec    Radiology report:  INTERPRETATION:  CLINICAL INFORMATION: Right lower back pain.    TECHNIQUE: CT of the lumbar spine was performed in bone andsoft tissue   windows with sagittal and coronal reformats. No intravenous contrast was   administered.    COMPARISON: Lumbar spine radiographs from 3/19/2021.    FINDINGS:    Bones: There are 5 lumbar type vertebral bodies. A chronic T12 vertebral   body compression fracture deformity is again seen in near vertebral   plana. A new L2 vertebral body superior endplate compression fracture   deformity is noted resulting in less than 50% loss of vertebral body   height with adjacent inflammatory change suggesting an acute fracture. A   new comminuted L3 vertebral body compression fracture deformity is seen   resulting in approximately 50% loss of vertebral body height with   adjacent inflammatory change suggesting an acute fracture. Grade 1   anterolisthesis of T11 on T12 and L2 on L3 and grade 1 retrolisthesis of   T12 on L1 is noted. Multilevel disc space narrowing is seen; mild to   moderate at L1-L2 and mild at L2-L3, L4-L5 and L5-S1. Marginal osteophyte   formation is noted throughout the lumbar spine.    Evaluation of the individual levels demonstrates the following:    T11-T12: Retropulsion of a T12 vertebral body fracture fragment resulting   in at least mild spinal canal stenosis. No significant neural foraminal   stenosis.  T12-L1: Retropulsion of a T12 vertebral body fracture fragment into   bilateral neural foramen resulting in moderate left and severe right   neural foraminal stenosis. No significant spinal canal stenosis.  L1-L2: Broad-based disc bulge resulting in mild spinal canal stenosis and   mild left and moderate right neural foraminal stenosis.  L2-L3: Broad-based disc bulge and uncovering of the disc resulting in   mild spinal canal stenosis and mild-to-moderate right neural foraminal   stenosis. No left neural foraminal stenosis.  L3-L4: Retropulsion of an L3 vertebral body fracture fragment resulting   in at least mild spinal canal stenosis and mild bilateral neural   foraminal stenosis.  L4-L5: Broad-based disc bulge resulting in mild spinal canal stenosis and   mild bilateral neural foraminal stenosis.  L5-S1: Broad-based disc bulge resulting in flattening of the ventral   thecal sac and mild left neural foraminal stenosis. No right neural   foraminal stenosis.    Soft tissues: Subsegmental atelectasis is seen in the visualized lung   bases. The retroperitoneum and paravertebral muscles are unremarkable.    IMPRESSION:  L2 and L3 vertebral body compression fracture deformities likely acute in   age.  T12 is chronic and observed again with retropulsion.      Physical Exam:  Constitutional: Awake / alert  HEENT: PERRLA, EOMI  Neck: Supple  Respiratory: Breath sounds are clear bilaterally  Cardiovascular: S1 and S2, regular rhythm  Gastrointestinal: Soft, NT/ND  Extremities:  no edema  Musculoskeletal: no abnormal movements  Skin: No rashes    Neurological Exam:  HF: A x O x 3, follows commands, normal affect  CN: PERRL, EOMI, no NLFD, tongue midline  Motor: Strength 5/5 in all 4 ext, normal bulk and tone  Sens: Intact to light touch  Reflexes: Symmetric and normal, no clonus, no Espinosa's   Coord:  No FNFA, dysmetria, JODIE intact   Gait/Balance: Cannot test    A/P:  74 y/o female with PMHx of presents to the ED c/o right lower lack/buttock/hip pain that started last week. Pt reports she slipped and did nearly a split, reports several days later she was getting out of recliner. Pt daily drinker, having difficulty ambulating without assistance. Pt denies head trauma, denies anticoagulation usage. Pt moves all extremeties, denies saddle type anesthesia or terrence-anal anesthesia.  report regular bowel and bladder events.  Ambulation & transitions causes significant LBP limiting her mobility past couple days.    - No acute neurosurgical intervention warranted  - Bedrest for now, HOB upto 20 degrees  - Aim for MRI of T/L spine studies  - Pt needs TLSO brace - call [place] [facility] in AM  - CT spine imaging reviewed  - CTH negative  - DVT ppx includes SCDs  - Bedrest for now with spine precautions until MRI of T/L spine reviewed  - care plan d/w and determined by attending - Dr. Matute - attending neurosurgeon           Neurosurgery Spine:    74 y/o female with PMHx of presents to the ED c/o right lower lack/buttock/hip pain that started last week. Pt reports she slipped and did nearly a split, reports several days later she was getting out of recliner. Pt daily drinker, having difficulty ambulating without assistance. Pt denies head trauma, denies anticoagulation usage. Pt moves all extremeties, denies saddle type anesthesia or terrence-anal anesthesia.  report regular bowel and bladder events.  Ambulation & transitions causes significant LBP limiting her mobility past couple days.    CTH neg  CTH T/L spine: chronic T12 comp fxr with retropulsion, suspect new L2 & L3 comp fxr's    PAST MEDICAL & SURGICAL HISTORY:  Hypertension, unspecified type  Depression  Cognitive impairment  Hyponatremia  ETOH abuse    History of D&C 2000  History of lumpectomy of right breast 1999    FAMILY HISTORY:  Non-contributory    Social Hx:    No Tob  No EtOH    Allergies  No Known Allergies    ROS: Pertinent positives in HPI, all other ROS were reviewed and are negative.     Review of Symptoms  	Gen: no fevers, chills, sweats, weight loss, fatigue  	Visual: no recent changes in vision  	Cardiovascular: no chest pain, no palpitations  	Respiratory: no shortness of breath, wheezing  	GI: no difficulty swallowing, no nausea, no vomiting  	: no dysuria, no increased freq, no hematuria,   	Derm: no wounds, no rashes  	Heme: no easy bleeding or bruising  	MSK: + LBP, denies extremety pain, no limb swelling              Neuro: no headache, numbness, weakness, AMS    Vital Signs Last 24 Hrs  T(C): 36.6 (15 Ulices 2024 06:23), Max: 36.8 (14 Jan 2024 17:43)  T(F): 97.9 (15 Ulices 2024 06:23), Max: 98.3 (14 Jan 2024 17:43)  HR: 82 (15 Ulices 2024 06:23) (68 - 95)  BP: 138/75 (15 Ulices 2024 06:23) (112/68 - 157/98)  BP(mean): 100 (15 Ulices 2024 01:20) (100 - 129)  RR: 17 (15 Ulices 2024 06:23) (16 - 19)  SpO2: 96% (15 Ulices 2024 06:23) (95% - 100%)    Parameters below as of 15 Ulices 2024 06:23  Patient On (Oxygen Delivery Method): room air        Labs:                        14.2   8.15  )-----------( 254      ( 14 Jan 2024 20:09 )             39.7     01-14    134<L>  |  102  |  11  ----------------------------<  85  4.5   |  23  |  0.59    Ca    8.8      14 Jan 2024 20:09    TPro  7.0  /  Alb  3.3  /  TBili  1.5<H>  /  DBili  x   /  AST  41<H>  /  ALT  37  /  AlkPhos  77  01-14    PT/INR - ( 14 Jan 2024 20:09 )   PT: 11.6 sec;   INR: 1.03 ratio    PTT - ( 14 Jan 2024 20:09 )  PTT:27.5 sec    Radiology report:  INTERPRETATION:  CLINICAL INFORMATION: Right lower back pain.    TECHNIQUE: CT of the lumbar spine was performed in bone andsoft tissue   windows with sagittal and coronal reformats. No intravenous contrast was   administered.    COMPARISON: Lumbar spine radiographs from 3/19/2021.    FINDINGS:    Bones: There are 5 lumbar type vertebral bodies. A chronic T12 vertebral   body compression fracture deformity is again seen in near vertebral   plana. A new L2 vertebral body superior endplate compression fracture   deformity is noted resulting in less than 50% loss of vertebral body   height with adjacent inflammatory change suggesting an acute fracture. A   new comminuted L3 vertebral body compression fracture deformity is seen   resulting in approximately 50% loss of vertebral body height with   adjacent inflammatory change suggesting an acute fracture. Grade 1   anterolisthesis of T11 on T12 and L2 on L3 and grade 1 retrolisthesis of   T12 on L1 is noted. Multilevel disc space narrowing is seen; mild to   moderate at L1-L2 and mild at L2-L3, L4-L5 and L5-S1. Marginal osteophyte   formation is noted throughout the lumbar spine.    Evaluation of the individual levels demonstrates the following:    T11-T12: Retropulsion of a T12 vertebral body fracture fragment resulting   in at least mild spinal canal stenosis. No significant neural foraminal   stenosis.  T12-L1: Retropulsion of a T12 vertebral body fracture fragment into   bilateral neural foramen resulting in moderate left and severe right   neural foraminal stenosis. No significant spinal canal stenosis.  L1-L2: Broad-based disc bulge resulting in mild spinal canal stenosis and   mild left and moderate right neural foraminal stenosis.  L2-L3: Broad-based disc bulge and uncovering of the disc resulting in   mild spinal canal stenosis and mild-to-moderate right neural foraminal   stenosis. No left neural foraminal stenosis.  L3-L4: Retropulsion of an L3 vertebral body fracture fragment resulting   in at least mild spinal canal stenosis and mild bilateral neural   foraminal stenosis.  L4-L5: Broad-based disc bulge resulting in mild spinal canal stenosis and   mild bilateral neural foraminal stenosis.  L5-S1: Broad-based disc bulge resulting in flattening of the ventral   thecal sac and mild left neural foraminal stenosis. No right neural   foraminal stenosis.    Soft tissues: Subsegmental atelectasis is seen in the visualized lung   bases. The retroperitoneum and paravertebral muscles are unremarkable.    IMPRESSION:  L2 and L3 vertebral body compression fracture deformities likely acute in   age.  T12 is chronic and observed again with retropulsion.      Physical Exam:  Constitutional: Awake / alert  HEENT: PERRLA, EOMI  Neck: Supple  Respiratory: Breath sounds are clear bilaterally  Cardiovascular: S1 and S2, regular rhythm  Gastrointestinal: Soft, NT/ND  Extremities:  no edema  Musculoskeletal: no abnormal movements  Skin: No rashes    Neurological Exam:  HF: A x O x 3, follows commands, normal affect  CN: PERRL, EOMI, no NLFD, tongue midline  Motor: Strength 5/5 in all 4 ext, normal bulk and tone  Sens: Intact to light touch  Reflexes: Symmetric and normal, no clonus, no Espinosa's   Coord:  No FNFA, dysmetria, JODIE intact   Gait/Balance: Cannot test    A/P:  74 y/o female with PMHx of presents to the ED c/o right lower lack/buttock/hip pain that started last week. Pt reports she slipped and did nearly a split, reports several days later she was getting out of recliner. Pt daily drinker, having difficulty ambulating without assistance. Pt denies head trauma, denies anticoagulation usage. Pt moves all extremeties, denies saddle type anesthesia or terrence-anal anesthesia.  report regular bowel and bladder events.  Ambulation & transitions causes significant LBP limiting her mobility past couple days.    - No acute neurosurgical intervention warranted  - Bedrest for now, HOB upto 20 degrees  - Aim for MRI of T/L spine studies  - Pt needs TLSO brace - call [place] [facility] in AM  - CT spine imaging reviewed  - CTH negative  - DVT ppx includes SCDs  - Bedrest for now with spine precautions until MRI of T/L spine reviewed  - care plan d/w and determined by attending - Dr. Matute - attending neurosurgeon           Neurosurgery Spine:    74 y/o female with PMHx of presents to the ED c/o right lower lack/buttock/hip pain that started last week. Pt reports she slipped and did nearly a split, reports several days later she was getting out of recliner. Pt daily drinker, having difficulty ambulating without assistance. Pt denies head trauma, denies anticoagulation usage. Pt moves all extremeties, denies saddle type anesthesia or terrence-anal anesthesia.  report regular bowel and bladder events.  Ambulation & transitions causes significant LBP limiting her mobility past couple days.    CTH neg  CTH T/L spine: chronic T12 comp fxr with retropulsion, suspect new L2 & L3 comp fxr's    PAST MEDICAL & SURGICAL HISTORY:  Hypertension, unspecified type  Depression  Cognitive impairment  Hyponatremia  ETOH abuse    History of D&C 2000  History of lumpectomy of right breast 1999    FAMILY HISTORY:  Non-contributory    Social Hx:    No Tob  No EtOH    Allergies  No Known Allergies    ROS: Pertinent positives in HPI, all other ROS were reviewed and are negative.     Review of Symptoms  	Gen: no fevers, chills, sweats, weight loss, fatigue  	Visual: no recent changes in vision  	Cardiovascular: no chest pain, no palpitations  	Respiratory: no shortness of breath, wheezing  	GI: no difficulty swallowing, no nausea, no vomiting  	: no dysuria, no increased freq, no hematuria,   	Derm: no wounds, no rashes  	Heme: no easy bleeding or bruising  	MSK: + LBP, denies extremety pain, no limb swelling              Neuro: no headache, numbness, weakness, AMS    Vital Signs Last 24 Hrs  T(C): 36.6 (15 Ulices 2024 06:23), Max: 36.8 (14 Jan 2024 17:43)  T(F): 97.9 (15 Ulices 2024 06:23), Max: 98.3 (14 Jan 2024 17:43)  HR: 82 (15 Ulices 2024 06:23) (68 - 95)  BP: 138/75 (15 Ulices 2024 06:23) (112/68 - 157/98)  BP(mean): 100 (15 Ulices 2024 01:20) (100 - 129)  RR: 17 (15 Ulices 2024 06:23) (16 - 19)  SpO2: 96% (15 Ulices 2024 06:23) (95% - 100%)    Parameters below as of 15 Ulices 2024 06:23  Patient On (Oxygen Delivery Method): room air        Labs:                        14.2   8.15  )-----------( 254      ( 14 Jan 2024 20:09 )             39.7     01-14    134<L>  |  102  |  11  ----------------------------<  85  4.5   |  23  |  0.59    Ca    8.8      14 Jan 2024 20:09    TPro  7.0  /  Alb  3.3  /  TBili  1.5<H>  /  DBili  x   /  AST  41<H>  /  ALT  37  /  AlkPhos  77  01-14    PT/INR - ( 14 Jan 2024 20:09 )   PT: 11.6 sec;   INR: 1.03 ratio    PTT - ( 14 Jan 2024 20:09 )  PTT:27.5 sec    Radiology report:  INTERPRETATION:  CLINICAL INFORMATION: Right lower back pain.    TECHNIQUE: CT of the lumbar spine was performed in bone andsoft tissue   windows with sagittal and coronal reformats. No intravenous contrast was   administered.    COMPARISON: Lumbar spine radiographs from 3/19/2021.    FINDINGS:    Bones: There are 5 lumbar type vertebral bodies. A chronic T12 vertebral   body compression fracture deformity is again seen in near vertebral   plana. A new L2 vertebral body superior endplate compression fracture   deformity is noted resulting in less than 50% loss of vertebral body   height with adjacent inflammatory change suggesting an acute fracture. A   new comminuted L3 vertebral body compression fracture deformity is seen   resulting in approximately 50% loss of vertebral body height with   adjacent inflammatory change suggesting an acute fracture. Grade 1   anterolisthesis of T11 on T12 and L2 on L3 and grade 1 retrolisthesis of   T12 on L1 is noted. Multilevel disc space narrowing is seen; mild to   moderate at L1-L2 and mild at L2-L3, L4-L5 and L5-S1. Marginal osteophyte   formation is noted throughout the lumbar spine.    Evaluation of the individual levels demonstrates the following:    T11-T12: Retropulsion of a T12 vertebral body fracture fragment resulting   in at least mild spinal canal stenosis. No significant neural foraminal   stenosis.  T12-L1: Retropulsion of a T12 vertebral body fracture fragment into   bilateral neural foramen resulting in moderate left and severe right   neural foraminal stenosis. No significant spinal canal stenosis.  L1-L2: Broad-based disc bulge resulting in mild spinal canal stenosis and   mild left and moderate right neural foraminal stenosis.  L2-L3: Broad-based disc bulge and uncovering of the disc resulting in   mild spinal canal stenosis and mild-to-moderate right neural foraminal   stenosis. No left neural foraminal stenosis.  L3-L4: Retropulsion of an L3 vertebral body fracture fragment resulting   in at least mild spinal canal stenosis and mild bilateral neural   foraminal stenosis.  L4-L5: Broad-based disc bulge resulting in mild spinal canal stenosis and   mild bilateral neural foraminal stenosis.  L5-S1: Broad-based disc bulge resulting in flattening of the ventral   thecal sac and mild left neural foraminal stenosis. No right neural   foraminal stenosis.    Soft tissues: Subsegmental atelectasis is seen in the visualized lung   bases. The retroperitoneum and paravertebral muscles are unremarkable.    IMPRESSION:  L2 and L3 vertebral body compression fracture deformities likely acute in   age.  T12 is chronic and observed again with retropulsion.      Physical Exam:  Constitutional: Awake / alert  HEENT: PERRLA, EOMI  Neck: Supple  Respiratory: Breath sounds are clear bilaterally  Cardiovascular: S1 and S2, regular rhythm  Gastrointestinal: Soft, NT/ND  Extremities:  no edema  Musculoskeletal: no abnormal movements  Skin: No rashes    Neurological Exam:  HF: A x O x 3, follows commands, normal affect  CN: PERRL, EOMI, no NLFD, tongue midline  Motor: Strength 5/5 except R IP 3/5, QD/HS/DF/PF 4/5  Sens: Intact to light touch  Reflexes: Symmetric/depressed, no clonus, no Espinosa's   Coord:  No FNFA, dysmetria, JODIE intact   Gait/Balance: Cannot test    A/P:  74 y/o female with PMHx of presents to the ED c/o right lower lack/buttock/hip pain that started last week. Pt reports she slipped and did nearly a split, reports several days later she was getting out of recliner. Pt daily drinker, having difficulty ambulating without assistance. Pt denies head trauma, denies anticoagulation usage. Pt moves all extremities denies saddle type anesthesia or terrence-anal anesthesia.  report regular bowel and bladder events.  Ambulation & transitions causes significant LBP limiting her mobility past couple days.    - No acute neurosurgical intervention warranted  - Bedrest for now, HOB upto 20 degrees  - Aim for MRI of T/L spine studies  - Pt needs TLSO brace - call [facility] in AM  - CT spine imaging reviewed  - CTH negative  - DVT ppx includes SCDs  - Bedrest for now with spine precautions until MRI of T/L spine reviewed  - care plan d/w and determined by attending - Dr. Matute - attending neurosurgeon    Addendum: MRI T/L Spine reviewed Chronic, severe compression fx T12 with retropulsion, acute L1-L3 compression fractures. Continue bedrest for now, OOB with TLSO brace likely tomorrow. Discussed with Son at bedside       Neurosurgery Spine:    72 y/o female with PMHx of presents to the ED c/o right lower lack/buttock/hip pain that started last week. Pt reports she slipped and did nearly a split, reports several days later she was getting out of recliner. Pt daily drinker, having difficulty ambulating without assistance. Pt denies head trauma, denies anticoagulation usage. Pt moves all extremeties, denies saddle type anesthesia or terrence-anal anesthesia.  report regular bowel and bladder events.  Ambulation & transitions causes significant LBP limiting her mobility past couple days.    CTH neg  CTH T/L spine: chronic T12 comp fxr with retropulsion, suspect new L2 & L3 comp fxr's    PAST MEDICAL & SURGICAL HISTORY:  Hypertension, unspecified type  Depression  Cognitive impairment  Hyponatremia  ETOH abuse    History of D&C 2000  History of lumpectomy of right breast 1999    FAMILY HISTORY:  Non-contributory    Social Hx:    No Tob  No EtOH    Allergies  No Known Allergies    ROS: Pertinent positives in HPI, all other ROS were reviewed and are negative.     Review of Symptoms  	Gen: no fevers, chills, sweats, weight loss, fatigue  	Visual: no recent changes in vision  	Cardiovascular: no chest pain, no palpitations  	Respiratory: no shortness of breath, wheezing  	GI: no difficulty swallowing, no nausea, no vomiting  	: no dysuria, no increased freq, no hematuria,   	Derm: no wounds, no rashes  	Heme: no easy bleeding or bruising  	MSK: + LBP, denies extremety pain, no limb swelling              Neuro: no headache, numbness, weakness, AMS    Vital Signs Last 24 Hrs  T(C): 36.6 (15 Ulices 2024 06:23), Max: 36.8 (14 Jan 2024 17:43)  T(F): 97.9 (15 Ulices 2024 06:23), Max: 98.3 (14 Jan 2024 17:43)  HR: 82 (15 Ulices 2024 06:23) (68 - 95)  BP: 138/75 (15 Ulices 2024 06:23) (112/68 - 157/98)  BP(mean): 100 (15 Ulices 2024 01:20) (100 - 129)  RR: 17 (15 Ulices 2024 06:23) (16 - 19)  SpO2: 96% (15 Ulices 2024 06:23) (95% - 100%)    Parameters below as of 15 Ulices 2024 06:23  Patient On (Oxygen Delivery Method): room air        Labs:                        14.2   8.15  )-----------( 254      ( 14 Jan 2024 20:09 )             39.7     01-14    134<L>  |  102  |  11  ----------------------------<  85  4.5   |  23  |  0.59    Ca    8.8      14 Jan 2024 20:09    TPro  7.0  /  Alb  3.3  /  TBili  1.5<H>  /  DBili  x   /  AST  41<H>  /  ALT  37  /  AlkPhos  77  01-14    PT/INR - ( 14 Jan 2024 20:09 )   PT: 11.6 sec;   INR: 1.03 ratio    PTT - ( 14 Jan 2024 20:09 )  PTT:27.5 sec    Radiology report:  INTERPRETATION:  CLINICAL INFORMATION: Right lower back pain.    TECHNIQUE: CT of the lumbar spine was performed in bone andsoft tissue   windows with sagittal and coronal reformats. No intravenous contrast was   administered.    COMPARISON: Lumbar spine radiographs from 3/19/2021.    FINDINGS:    Bones: There are 5 lumbar type vertebral bodies. A chronic T12 vertebral   body compression fracture deformity is again seen in near vertebral   plana. A new L2 vertebral body superior endplate compression fracture   deformity is noted resulting in less than 50% loss of vertebral body   height with adjacent inflammatory change suggesting an acute fracture. A   new comminuted L3 vertebral body compression fracture deformity is seen   resulting in approximately 50% loss of vertebral body height with   adjacent inflammatory change suggesting an acute fracture. Grade 1   anterolisthesis of T11 on T12 and L2 on L3 and grade 1 retrolisthesis of   T12 on L1 is noted. Multilevel disc space narrowing is seen; mild to   moderate at L1-L2 and mild at L2-L3, L4-L5 and L5-S1. Marginal osteophyte   formation is noted throughout the lumbar spine.    Evaluation of the individual levels demonstrates the following:    T11-T12: Retropulsion of a T12 vertebral body fracture fragment resulting   in at least mild spinal canal stenosis. No significant neural foraminal   stenosis.  T12-L1: Retropulsion of a T12 vertebral body fracture fragment into   bilateral neural foramen resulting in moderate left and severe right   neural foraminal stenosis. No significant spinal canal stenosis.  L1-L2: Broad-based disc bulge resulting in mild spinal canal stenosis and   mild left and moderate right neural foraminal stenosis.  L2-L3: Broad-based disc bulge and uncovering of the disc resulting in   mild spinal canal stenosis and mild-to-moderate right neural foraminal   stenosis. No left neural foraminal stenosis.  L3-L4: Retropulsion of an L3 vertebral body fracture fragment resulting   in at least mild spinal canal stenosis and mild bilateral neural   foraminal stenosis.  L4-L5: Broad-based disc bulge resulting in mild spinal canal stenosis and   mild bilateral neural foraminal stenosis.  L5-S1: Broad-based disc bulge resulting in flattening of the ventral   thecal sac and mild left neural foraminal stenosis. No right neural   foraminal stenosis.    Soft tissues: Subsegmental atelectasis is seen in the visualized lung   bases. The retroperitoneum and paravertebral muscles are unremarkable.    IMPRESSION:  L2 and L3 vertebral body compression fracture deformities likely acute in   age.  T12 is chronic and observed again with retropulsion.      Physical Exam:  Constitutional: Awake / alert  HEENT: PERRLA, EOMI  Neck: Supple  Respiratory: Breath sounds are clear bilaterally  Cardiovascular: S1 and S2, regular rhythm  Gastrointestinal: Soft, NT/ND  Extremities:  no edema  Musculoskeletal: no abnormal movements  Skin: No rashes    Neurological Exam:  HF: A x O x 3, follows commands, normal affect  CN: PERRL, EOMI, no NLFD, tongue midline  Motor: Strength 5/5 except R IP 3/5, QD/HS/DF/PF 4/5  Sens: Intact to light touch  Reflexes: Symmetric/depressed, no clonus, no Espinosa's   Coord:  No FNFA, dysmetria, JODIE intact   Gait/Balance: Cannot test    A/P:  72 y/o female with PMHx of presents to the ED c/o right lower lack/buttock/hip pain that started last week. Pt reports she slipped and did nearly a split, reports several days later she was getting out of recliner. Pt daily drinker, having difficulty ambulating without assistance. Pt denies head trauma, denies anticoagulation usage. Pt moves all extremities denies saddle type anesthesia or terrence-anal anesthesia.  report regular bowel and bladder events.  Ambulation & transitions causes significant LBP limiting her mobility past couple days.    - No acute neurosurgical intervention warranted  - Bedrest for now, HOB upto 20 degrees  - Aim for MRI of T/L spine studies  - Pt needs TLSO brace - call [place] [facility] in AM  - CT spine imaging reviewed  - CTH negative  - DVT ppx includes SCDs  - Bedrest for now with spine precautions until MRI of T/L spine reviewed  - care plan d/w and determined by attending - Dr. Matute - attending neurosurgeon    Addendum: MRI T/L Spine reviewed Chronic, severe compression fx T12 with retropulsion, acute L1-L3 compression fractures. Continue bedrest for now, OOB with TLSO brace likely tomorrow. Discussed with Son at bedside       Neurosurgery Spine:    74 y/o female with PMHx of presents to the ED c/o right lower lack/buttock/hip pain that started last week. Pt reports she slipped and did nearly a split, reports several days later she was getting out of recliner. Pt daily drinker, having difficulty ambulating without assistance. Pt denies head trauma, denies anticoagulation usage. Pt moves all extremeties, denies saddle type anesthesia or terrence-anal anesthesia.  report regular bowel and bladder events.  Ambulation & transitions causes significant LBP limiting her mobility past couple days.    CTH neg  CTH T/L spine: chronic T12 comp fxr with retropulsion, suspect new L2 & L3 comp fxr's    PAST MEDICAL & SURGICAL HISTORY:  Hypertension, unspecified type  Depression  Cognitive impairment  Hyponatremia  ETOH abuse    History of D&C 2000  History of lumpectomy of right breast 1999    FAMILY HISTORY:  Non-contributory    Social Hx:    No Tob  No EtOH    Allergies  No Known Allergies    ROS: Pertinent positives in HPI, all other ROS were reviewed and are negative.     Review of Symptoms  	Gen: no fevers, chills, sweats, weight loss, fatigue  	Visual: no recent changes in vision  	Cardiovascular: no chest pain, no palpitations  	Respiratory: no shortness of breath, wheezing  	GI: no difficulty swallowing, no nausea, no vomiting  	: no dysuria, no increased freq, no hematuria,   	Derm: no wounds, no rashes  	Heme: no easy bleeding or bruising  	MSK: + LBP, denies extremety pain, no limb swelling              Neuro: no headache, numbness, weakness, AMS    Vital Signs Last 24 Hrs  T(C): 36.6 (15 Ulices 2024 06:23), Max: 36.8 (14 Jan 2024 17:43)  T(F): 97.9 (15 Ulices 2024 06:23), Max: 98.3 (14 Jan 2024 17:43)  HR: 82 (15 Ulices 2024 06:23) (68 - 95)  BP: 138/75 (15 Ulices 2024 06:23) (112/68 - 157/98)  BP(mean): 100 (15 Ulices 2024 01:20) (100 - 129)  RR: 17 (15 Ulices 2024 06:23) (16 - 19)  SpO2: 96% (15 Ulices 2024 06:23) (95% - 100%)    Parameters below as of 15 Ulices 2024 06:23  Patient On (Oxygen Delivery Method): room air        Labs:                        14.2   8.15  )-----------( 254      ( 14 Jan 2024 20:09 )             39.7     01-14    134<L>  |  102  |  11  ----------------------------<  85  4.5   |  23  |  0.59    Ca    8.8      14 Jan 2024 20:09    TPro  7.0  /  Alb  3.3  /  TBili  1.5<H>  /  DBili  x   /  AST  41<H>  /  ALT  37  /  AlkPhos  77  01-14    PT/INR - ( 14 Jan 2024 20:09 )   PT: 11.6 sec;   INR: 1.03 ratio    PTT - ( 14 Jan 2024 20:09 )  PTT:27.5 sec    Radiology report:  INTERPRETATION:  CLINICAL INFORMATION: Right lower back pain.    TECHNIQUE: CT of the lumbar spine was performed in bone andsoft tissue   windows with sagittal and coronal reformats. No intravenous contrast was   administered.    COMPARISON: Lumbar spine radiographs from 3/19/2021.    FINDINGS:    Bones: There are 5 lumbar type vertebral bodies. A chronic T12 vertebral   body compression fracture deformity is again seen in near vertebral   plana. A new L2 vertebral body superior endplate compression fracture   deformity is noted resulting in less than 50% loss of vertebral body   height with adjacent inflammatory change suggesting an acute fracture. A   new comminuted L3 vertebral body compression fracture deformity is seen   resulting in approximately 50% loss of vertebral body height with   adjacent inflammatory change suggesting an acute fracture. Grade 1   anterolisthesis of T11 on T12 and L2 on L3 and grade 1 retrolisthesis of   T12 on L1 is noted. Multilevel disc space narrowing is seen; mild to   moderate at L1-L2 and mild at L2-L3, L4-L5 and L5-S1. Marginal osteophyte   formation is noted throughout the lumbar spine.    Evaluation of the individual levels demonstrates the following:    T11-T12: Retropulsion of a T12 vertebral body fracture fragment resulting   in at least mild spinal canal stenosis. No significant neural foraminal   stenosis.  T12-L1: Retropulsion of a T12 vertebral body fracture fragment into   bilateral neural foramen resulting in moderate left and severe right   neural foraminal stenosis. No significant spinal canal stenosis.  L1-L2: Broad-based disc bulge resulting in mild spinal canal stenosis and   mild left and moderate right neural foraminal stenosis.  L2-L3: Broad-based disc bulge and uncovering of the disc resulting in   mild spinal canal stenosis and mild-to-moderate right neural foraminal   stenosis. No left neural foraminal stenosis.  L3-L4: Retropulsion of an L3 vertebral body fracture fragment resulting   in at least mild spinal canal stenosis and mild bilateral neural   foraminal stenosis.  L4-L5: Broad-based disc bulge resulting in mild spinal canal stenosis and   mild bilateral neural foraminal stenosis.  L5-S1: Broad-based disc bulge resulting in flattening of the ventral   thecal sac and mild left neural foraminal stenosis. No right neural   foraminal stenosis.    Soft tissues: Subsegmental atelectasis is seen in the visualized lung   bases. The retroperitoneum and paravertebral muscles are unremarkable.    IMPRESSION:  L2 and L3 vertebral body compression fracture deformities likely acute in   age.  T12 is chronic and observed again with retropulsion.      Physical Exam:  Constitutional: Awake / alert  HEENT: PERRLA, EOMI  Neck: Supple  Respiratory: Breath sounds are clear bilaterally  Cardiovascular: S1 and S2, regular rhythm  Gastrointestinal: Soft, NT/ND  Extremities:  no edema  Musculoskeletal: no abnormal movements  Skin: No rashes    Neurological Exam:  HF: A x O x 3, follows commands, normal affect  CN: PERRL, EOMI, no NLFD, tongue midline  Motor: Strength 5/5 except R IP 3/5, QD/HS/DF/PF 4/5  Sens: Intact to light touch  Reflexes: Symmetric/depressed, no clonus, no Espinosa's   Coord:  No FNFA, dysmetria, JODIE intact   Gait/Balance: Cannot test    A/P:  74 y/o female with PMHx of presents to the ED c/o right lower lack/buttock/hip pain that started last week. Pt reports she slipped and did nearly a split, reports several days later she was getting out of recliner. Pt daily drinker, having difficulty ambulating without assistance. Pt denies head trauma, denies anticoagulation usage. Pt moves all extremities denies saddle type anesthesia or terrence-anal anesthesia.  report regular bowel and bladder events.  Ambulation & transitions causes significant LBP limiting her mobility past couple days.    - No acute neurosurgical intervention warranted  - Bedrest for now, HOB upto 20 degrees  - Aim for MRI of T/L spine studies  - Pt needs TLSO brace - call [facility] in AM  - CT spine imaging reviewed  - CTH negative  - DVT ppx includes SCDs  - Bedrest for now with spine precautions until MRI of T/L spine reviewed  - care plan d/w and determined by attending - Dr. Matute - attending neurosurgeon    Addendum: MRI T/L Spine reviewed Chronic, severe compression fx T12 with retropulsion, acute L1-L3 compression fractures. Continue bedrest for now, OOB with TLSO brace likely tomorrow. Discussed with Son at bedside

## 2024-01-15 NOTE — SBIRT NOTE ADULT - NSSBIRTALCACTION/INTER_GEN_A_CORE
SW attempted to offer resources and education, pt reports she does not feel her current ETOH use is excessive, denies it is a "problem." Pt declining resources at this time.

## 2024-01-15 NOTE — SBIRT NOTE ADULT - NSSBIRTINJURYRES_GEN_A_CORE
Pt with recent falls within two days of each other, injuring knees during one fall and injuring back as a result of most recent fall, prompting  admission. SW inquired if falls happened following ETOH use, pt reports she cannot remember./No

## 2024-01-15 NOTE — PATIENT PROFILE ADULT - CONTRAINDICATIONS & PRECAUTIONS (SELECT ALL THAT APPLY)
Pt reports L lower back pain radiating to the L leg x1 week. Pt denies trauma or injury to the area. Pt ambulatory in triage. none...

## 2024-01-15 NOTE — CHART NOTE - NSCHARTNOTEFT_GEN_A_CORE
Patient was measured and dispensed a TLSO with a rigid posterior panel extending from the sacrococcygeal junction to the scapular spine with a soft apron front. The TLSO will stabilize and control the spine reduce pain and ROM, allow for safer ambulation and facilitate healing of the fracture sites. Care use and function were explained to the patient and her daughter. Contact info was given. All went without incident.   [facility]  [phone] Patient was measured and dispensed a TLSO with a rigid posterior panel extending from the sacrococcygeal junction to the scapular spine with a soft apron front. The TLSO will stabilize and control the spine reduce pain and ROM, allow for safer ambulation and facilitate healing of the fracture sites. Care use and function were explained to the patient and her daughter. Contact info was given. All went without incident.   [facility]  [phone] Patient was measured and dispensed a TLSO with a rigid posterior panel extending from the sacrococcygeal junction to the scapular spine with a soft apron front. The TLSO will stabilize and control the spine reduce pain and ROM, allow for safer ambulation and facilitate healing of the fracture sites. Care use and function were explained to the patient and her daughter. Contact info was given. All went without incident.   [facility]  [phone]

## 2024-01-15 NOTE — H&P ADULT - NSHPLABSRESULTS_GEN_ALL_CORE
14.2   8.15  )-----------( 254      ( 14 Jan 2024 20:09 )             39.7   01-14    134<L>  |  102  |  11  ----------------------------<  85  4.5   |  23  |  0.59    Ca    8.8      14 Jan 2024 20:09    TPro  7.0  /  Alb  3.3  /  TBili  1.5<H>  /  DBili  x   /  AST  41<H>  /  ALT  37  /  AlkPhos  77  01-14      < from: CT Lumbar Spine No Cont (01.14.24 @ 21:32) >    IMPRESSION:  L2 and L3 vertebral body compression fracture deformities likely acute in   age. 14.2   8.15  )-----------( 254      ( 14 Jan 2024 20:09 )             39.7   01-14    134<L>  |  102  |  11  ----------------------------<  85  4.5   |  23  |  0.59    Ca    8.8      14 Jan 2024 20:09    TPro  7.0  /  Alb  3.3  /  TBili  1.5<H>  /  DBili  x   /  AST  41<H>  /  ALT  37  /  AlkPhos  77  01-14      < from: CT Lumbar Spine No Cont (01.14.24 @ 21:32) >    IMPRESSION:  L2 and L3 vertebral body compression fracture deformities likely acute in   age.        MRI  IMPRESSION: Degenerative changes involving the thoracic and lumbar spine   region.    Severe compression fracture involving the T12 is identified.    There is evidence of abnormal signal with suspected fracture seen   involving the L1-L2 and L3 vertebral bodies. These are likely compatible   with acute fractures and associated edema.    Retropulsed fragments are identified as described above.

## 2024-01-15 NOTE — H&P ADULT - HISTORY OF PRESENT ILLNESS
72 y/o female with PMHx of EtOH abuse, hyponatremia, cognitive impairment, HTN, HLD; presents to the ED s/p x2 falls, 1/10 fell onto R knee which was improving but on 1/12 had another fall, after her foot got caught and she tripped, fell on R hip. Patient c/o R hip pain, has difficulty standing upright and unable to ambulate. Per daughter at bedside, patient lives alone, now is unable to do daily tasks by herself and requires assistance moving around, has not been eating or hydrating well. Reported EtOH use daily last consumption yesterday, a bottle of wine. Patient x2 falls, unable to stand on her own or self-ambulate. Pt has a brace at home and fam will bring 74 y/o female with PMHx of EtOH abuse, hyponatremia, cognitive impairment, HTN, HLD; presents to the ED s/p x2 falls, 1/10 fell onto R knee which was improving but on 1/12 had another fall, after her foot got caught and she tripped, fell on R hip. Patient c/o R hip pain, has difficulty standing upright and unable to ambulate. Per daughter at bedside, patient lives alone, now is unable to do daily tasks by herself and requires assistance moving around, has not been eating or hydrating well. Reported EtOH use daily last consumption yesterday, a bottle of wine. Patient x2 falls, unable to stand on her own or self-ambulate. Pt has a brace at home and fam will bring

## 2024-01-15 NOTE — SBIRT NOTE ADULT - NSSBIRTUNABLEREM_GEN_A_CORE
Pt with recent falls within two days of each other. SW inquired if falls happened following ETOH use, pt reports she cannot remember./Never

## 2024-01-15 NOTE — H&P ADULT - NSHPPHYSICALEXAM_GEN_ALL_CORE
Vital Signs Last 24 Hrs  T(C): 36.6 (15 Ulices 2024 06:23), Max: 36.8 (14 Jan 2024 17:43)  T(F): 97.9 (15 Ulices 2024 06:23), Max: 98.3 (14 Jan 2024 17:43)  HR: 82 (15 Ulices 2024 06:23) (68 - 95)  BP: 138/75 (15 Ulices 2024 06:23) (112/68 - 157/98)  BP(mean): 100 (15 Ulices 2024 01:20) (100 - 129)  RR: 17 (15 Ulices 2024 06:23) (16 - 19)  SpO2: 96% (15 Ulices 2024 06:23) (95% - 100%)    Parameters below as of 15 Ulices 2024 06:23  Patient On (Oxygen Delivery Method): room air        PHYSICAL EXAM:      Constitutional: NAD  Eyes: perrl, no conjunctival changes  ENMT: no exudates, moist oral muc, uvula midline  Neck: no JVD, no LAD  Back: no cva tenderness  Respiratory: CTA, no exp wheezes  Cardiovascular: S1S2 reg, no murmur gallop or rub  Gastrointestinal: abd soft, NT/ND + BS  Genitourinary: voiding  Extremities: FROM, no joint effusions, no edema, no clubbing , no cyanosis  Vascular: pedal pulses + bilateral, warm extremities  Neurological: non focal, mot str 5/5/ all extr  Skin: no rashes  Lymph Nodes: no LAD

## 2024-01-15 NOTE — PATIENT PROFILE ADULT - NSPROPOAPRESSUREINJURY_GEN_A_NUR
CYSTOSCOPY BLADDER BIOPSY  Progress Note    [place]  6/12/2019    Pre-op Diagnosis:   Bladder cancer        Post-Op Diagnosis Codes:  Same  POSTERIOR BLADDER WALL RIGHT     Procedure/CPT® Codes:      Procedure(s):  CYSTOSCOPY WITH TUR SMALL BLADDER TUMOR     Surgeon(s):  Joel Russell MD    Anesthesia: General    Staff:   * No surgical staff found *    Estimated Blood Loss: none    Urine Voided: * No values recorded between 6/12/2019  9:43 AM and 6/12/2019  9:45 AM *    Specimens:              Bladder tumor           Drains:      Findings: superficial tcc      Complications:      Joel Russell MD     Date: 6/12/2019  Time: 9:45 AM       no

## 2024-01-16 LAB
HCV AB S/CO SERPL IA: 0.07 S/CO — SIGNIFICANT CHANGE UP (ref 0–0.99)
HCV AB SERPL-IMP: SIGNIFICANT CHANGE UP

## 2024-01-16 PROCEDURE — 73562 X-RAY EXAM OF KNEE 3: CPT | Mod: 26,50

## 2024-01-16 PROCEDURE — 99232 SBSQ HOSP IP/OBS MODERATE 35: CPT

## 2024-01-16 RX ORDER — LIDOCAINE 4 G/100G
1 CREAM TOPICAL DAILY
Refills: 0 | Status: DISCONTINUED | OUTPATIENT
Start: 2024-01-16 | End: 2024-01-18

## 2024-01-16 RX ORDER — FENOFIBRIC ACID 105 MG/1
1 TABLET ORAL
Refills: 0 | DISCHARGE

## 2024-01-16 RX ORDER — LEVOTHYROXINE SODIUM 125 MCG
0 TABLET ORAL
Refills: 0 | DISCHARGE

## 2024-01-16 RX ORDER — ESCITALOPRAM OXALATE 10 MG/1
1 TABLET, FILM COATED ORAL
Refills: 0 | DISCHARGE

## 2024-01-16 RX ORDER — ACETAMINOPHEN 500 MG
1000 TABLET ORAL ONCE
Refills: 0 | Status: DISCONTINUED | OUTPATIENT
Start: 2024-01-16 | End: 2024-01-18

## 2024-01-16 RX ORDER — GABAPENTIN 400 MG/1
100 CAPSULE ORAL
Refills: 0 | Status: DISCONTINUED | OUTPATIENT
Start: 2024-01-16 | End: 2024-01-18

## 2024-01-16 RX ORDER — METOPROLOL TARTRATE 50 MG
0 TABLET ORAL
Refills: 0 | DISCHARGE

## 2024-01-16 RX ORDER — METOPROLOL TARTRATE 50 MG
1 TABLET ORAL
Refills: 0 | DISCHARGE

## 2024-01-16 RX ORDER — CYCLOBENZAPRINE HYDROCHLORIDE 10 MG/1
5 TABLET, FILM COATED ORAL THREE TIMES A DAY
Refills: 0 | Status: DISCONTINUED | OUTPATIENT
Start: 2024-01-16 | End: 2024-01-18

## 2024-01-16 RX ORDER — ATORVASTATIN CALCIUM 80 MG/1
0 TABLET, FILM COATED ORAL
Refills: 0 | DISCHARGE

## 2024-01-16 RX ORDER — LEVOTHYROXINE SODIUM 125 MCG
1 TABLET ORAL
Refills: 0 | DISCHARGE

## 2024-01-16 RX ORDER — ESCITALOPRAM OXALATE 10 MG/1
0 TABLET, FILM COATED ORAL
Refills: 0 | DISCHARGE

## 2024-01-16 RX ORDER — ATORVASTATIN CALCIUM 80 MG/1
1 TABLET, FILM COATED ORAL
Refills: 0 | DISCHARGE

## 2024-01-16 RX ORDER — FENOFIBRATE,MICRONIZED 130 MG
1 CAPSULE ORAL
Qty: 0 | Refills: 0 | DISCHARGE

## 2024-01-16 RX ADMIN — Medication 25 MILLIGRAM(S): at 20:37

## 2024-01-16 RX ADMIN — TRAMADOL HYDROCHLORIDE 50 MILLIGRAM(S): 50 TABLET ORAL at 07:54

## 2024-01-16 RX ADMIN — HEPARIN SODIUM 5000 UNIT(S): 5000 INJECTION INTRAVENOUS; SUBCUTANEOUS at 20:37

## 2024-01-16 RX ADMIN — LIDOCAINE 1 PATCH: 4 CREAM TOPICAL at 14:00

## 2024-01-16 RX ADMIN — TRAMADOL HYDROCHLORIDE 50 MILLIGRAM(S): 50 TABLET ORAL at 00:27

## 2024-01-16 RX ADMIN — Medication 50 MICROGRAM(S): at 05:20

## 2024-01-16 RX ADMIN — GABAPENTIN 100 MILLIGRAM(S): 400 CAPSULE ORAL at 20:37

## 2024-01-16 RX ADMIN — Medication 25 MILLIGRAM(S): at 07:54

## 2024-01-16 RX ADMIN — ATORVASTATIN CALCIUM 40 MILLIGRAM(S): 80 TABLET, FILM COATED ORAL at 20:36

## 2024-01-16 RX ADMIN — ESCITALOPRAM OXALATE 10 MILLIGRAM(S): 10 TABLET, FILM COATED ORAL at 07:54

## 2024-01-16 RX ADMIN — HEPARIN SODIUM 5000 UNIT(S): 5000 INJECTION INTRAVENOUS; SUBCUTANEOUS at 07:54

## 2024-01-16 NOTE — PROGRESS NOTE ADULT - SUBJECTIVE AND OBJECTIVE BOX
HPI:  72 y/o female with PMHx of HTN, Depression, Cognitive impairment, Hyponatremia, ETOH abuse presents to the ED c/o right lower lack/buttock/hip pain that started last week. Pt reports she slipped and fell last Monday, then couple days later tripped and did nearly a split getting out of recliner. Pt daily drinker, having difficulty ambulating without assistance. Pt denies head trauma, denies anticoagulation usage. Pt moves all extremities denies saddle type anesthesia or terrence-anal anesthesia.  Report regular bowel and bladder events.  Ambulation & transitions causes significant LBP limiting her mobility past couple days.    1/16: Patient seen and examined this AM, no acute events overnight. MRI reviewed with patient and Daughter at bedside. + TLSO at bedside. Continues to report R sided LBP, denies numb/tingling, + weakness unchanged which she reports in both legs. C/o knee pain b/l due to her falls, pain improving.    Vital Signs Last 24 Hrs  T(C): 36.6 (16 Jan 2024 07:55), Max: 36.8 (15 Ulices 2024 21:32)  T(F): 97.9 (16 Jan 2024 07:55), Max: 98.3 (15 Ulices 2024 21:32)  HR: 73 (16 Jan 2024 07:55) (73 - 99)  BP: 170/82 (16 Jan 2024 07:55) (115/74 - 170/82)  BP(mean): --  RR: 18 (16 Jan 2024 07:55) (18 - 18)  SpO2: 91% (16 Jan 2024 07:55) (91% - 93%)    Parameters below as of 16 Jan 2024 07:55  Patient On (Oxygen Delivery Method): room air        MEDICATIONS  (STANDING):  atorvastatin 40 milliGRAM(s) Oral at bedtime  escitalopram 10 milliGRAM(s) Oral daily  heparin   Injectable 5000 Unit(s) SubCutaneous every 12 hours  influenza  Vaccine (HIGH DOSE) 0.7 milliLiter(s) IntraMuscular once  levothyroxine 50 MICROGram(s) Oral daily  metoprolol tartrate 25 milliGRAM(s) Oral two times a day    MEDICATIONS  (PRN):  acetaminophen     Tablet .. 650 milliGRAM(s) Oral every 6 hours PRN Temp greater or equal to 38C (100.4F), Mild Pain (1 - 3)  aluminum hydroxide/magnesium hydroxide/simethicone Suspension 30 milliLiter(s) Oral every 4 hours PRN Dyspepsia  melatonin 3 milliGRAM(s) Oral at bedtime PRN Insomnia  ondansetron Injectable 4 milliGRAM(s) IV Push every 8 hours PRN Nausea and/or Vomiting  traMADol 50 milliGRAM(s) Oral every 4 hours PRN Moderate Pain (4 - 6)      PHYSICAL EXAM:  Constitutional: Awake / alert  HEENT: PERRL, EOMI  Neck: Supple  Respiratory: Breath sounds are clear bilaterally  Cardiovascular: S1 and S2, regular rhythm  Gastrointestinal: Soft, NT/ND  Extremities:  mild nonpitting edema, + L ankle swelling (chronic)  Musculoskeletal: + TTP thoracolumbar midline, right lumbar paraspinals  Skin: No rashes    Neurological Exam:  HF: A x O x 3, follows commands, normal affect  CN: PERRL, EOMI, no NLFD, tongue midline  Motor: Strength 5/5 except R QD 4/5  Sens: Intact to light touch  Reflexes: Symmetric/depressed, no clonus, no Espinosa's   Coord:  No FNFA, dysmetria  Gait/Balance: Cannot test        LABS:                         14.2   8.15  )-----------( 254      ( 14 Jan 2024 20:09 )             39.7     01-14    134<L>  |  102  |  11  ----------------------------<  85  4.5   |  23  |  0.59    Ca    8.8      14 Jan 2024 20:09    TPro  7.0  /  Alb  3.3  /  TBili  1.5<H>  /  DBili  x   /  AST  41<H>  /  ALT  37  /  AlkPhos  77  01-14    LIVER FUNCTIONS - ( 14 Jan 2024 20:09 )  Alb: 3.3 g/dL / Pro: 7.0 gm/dL / ALK PHOS: 77 U/L / ALT: 37 U/L / AST: 41 U/L / GGT: x                 RADIOLOGY:  MR Thoracic/Lumbar Spine No Cont (01.15.24 @ 14:14)  IMPRESSION: Degenerative changes involving the thoracic and lumbar spine   region.    Severe compression fracture involving the T12 is identified.    There is evidence of abnormal signal with suspected fracture seen   involving the L1-L2 and L3 vertebral bodies. These are likely compatible   with acute fractures and associated edema.    Retropulsed fragments are identified as described above.   HPI:  74 y/o female with PMHx of HTN, Depression, Cognitive impairment, Hyponatremia, ETOH abuse presents to the ED c/o right lower lack/buttock/hip pain that started last week. Pt reports she slipped and fell last Monday, then couple days later tripped and did nearly a split getting out of recliner. Pt daily drinker, having difficulty ambulating without assistance. Pt denies head trauma, denies anticoagulation usage. Pt moves all extremities denies saddle type anesthesia or terrence-anal anesthesia.  Report regular bowel and bladder events.  Ambulation & transitions causes significant LBP limiting her mobility past couple days.    1/16: Patient seen and examined this AM, no acute events overnight. MRI reviewed with patient and Daughter at bedside. + TLSO at bedside. Continues to report R sided LBP, denies numb/tingling, + weakness unchanged which she reports in both legs. C/o knee pain b/l due to her falls, pain improving.    Vital Signs Last 24 Hrs  T(C): 36.6 (16 Jan 2024 07:55), Max: 36.8 (15 Ulices 2024 21:32)  T(F): 97.9 (16 Jan 2024 07:55), Max: 98.3 (15 Ulices 2024 21:32)  HR: 73 (16 Jan 2024 07:55) (73 - 99)  BP: 170/82 (16 Jan 2024 07:55) (115/74 - 170/82)  BP(mean): --  RR: 18 (16 Jan 2024 07:55) (18 - 18)  SpO2: 91% (16 Jan 2024 07:55) (91% - 93%)    Parameters below as of 16 Jan 2024 07:55  Patient On (Oxygen Delivery Method): room air        MEDICATIONS  (STANDING):  atorvastatin 40 milliGRAM(s) Oral at bedtime  escitalopram 10 milliGRAM(s) Oral daily  heparin   Injectable 5000 Unit(s) SubCutaneous every 12 hours  influenza  Vaccine (HIGH DOSE) 0.7 milliLiter(s) IntraMuscular once  levothyroxine 50 MICROGram(s) Oral daily  metoprolol tartrate 25 milliGRAM(s) Oral two times a day    MEDICATIONS  (PRN):  acetaminophen     Tablet .. 650 milliGRAM(s) Oral every 6 hours PRN Temp greater or equal to 38C (100.4F), Mild Pain (1 - 3)  aluminum hydroxide/magnesium hydroxide/simethicone Suspension 30 milliLiter(s) Oral every 4 hours PRN Dyspepsia  melatonin 3 milliGRAM(s) Oral at bedtime PRN Insomnia  ondansetron Injectable 4 milliGRAM(s) IV Push every 8 hours PRN Nausea and/or Vomiting  traMADol 50 milliGRAM(s) Oral every 4 hours PRN Moderate Pain (4 - 6)      PHYSICAL EXAM:  Constitutional: Awake / alert  HEENT: PERRL, EOMI  Neck: Supple  Respiratory: Breath sounds are clear bilaterally  Cardiovascular: S1 and S2, regular rhythm  Gastrointestinal: Soft, NT/ND  Extremities:  mild nonpitting edema, + L ankle swelling (chronic)  Musculoskeletal: + TTP thoracolumbar midline, right lumbar paraspinals  Skin: No rashes    Neurological Exam:  HF: A x O x 3, follows commands, normal affect  CN: PERRL, EOMI, no NLFD, tongue midline  Motor: Strength 5/5 except R QD 4/5  Sens: Intact to light touch  Reflexes: Symmetric/depressed, no clonus, no Espinosa's   Coord:  No FNFA, dysmetria  Gait/Balance: Cannot test        LABS:                         14.2   8.15  )-----------( 254      ( 14 Jan 2024 20:09 )             39.7     01-14    134<L>  |  102  |  11  ----------------------------<  85  4.5   |  23  |  0.59    Ca    8.8      14 Jan 2024 20:09    TPro  7.0  /  Alb  3.3  /  TBili  1.5<H>  /  DBili  x   /  AST  41<H>  /  ALT  37  /  AlkPhos  77  01-14    LIVER FUNCTIONS - ( 14 Jan 2024 20:09 )  Alb: 3.3 g/dL / Pro: 7.0 gm/dL / ALK PHOS: 77 U/L / ALT: 37 U/L / AST: 41 U/L / GGT: x                 RADIOLOGY:  MR Thoracic/Lumbar Spine No Cont (01.15.24 @ 14:14)  IMPRESSION: Degenerative changes involving the thoracic and lumbar spine   region.    Severe compression fracture involving the T12 is identified.    There is evidence of abnormal signal with suspected fracture seen   involving the L1-L2 and L3 vertebral bodies. These are likely compatible   with acute fractures and associated edema.    Retropulsed fragments are identified as described above.   HPI:  72 y/o female with PMHx of HTN, Osteopenia, Depression, Cognitive impairment, Hyponatremia, ETOH abuse presents to the ED c/o right lower lack/buttock/hip pain that started last week. Pt reports she slipped and fell last Monday, then couple days later tripped and did nearly a split getting out of recliner. Pt daily drinker, having difficulty ambulating without assistance. Pt denies head trauma, denies anticoagulation usage. Pt moves all extremities denies saddle type anesthesia or terrence-anal anesthesia.  Report regular bowel and bladder events.  Ambulation & transitions causes significant LBP limiting her mobility past couple days.    1/16: Patient seen and examined this AM, no acute events overnight. MRI reviewed with patient and Daughter at bedside. + TLSO at bedside. Continues to report R sided LBP, denies numb/tingling, + weakness unchanged which she reports in both legs. C/o knee pain b/l due to her falls, pain improving.    Vital Signs Last 24 Hrs  T(C): 36.6 (16 Jan 2024 07:55), Max: 36.8 (15 Ulices 2024 21:32)  T(F): 97.9 (16 Jan 2024 07:55), Max: 98.3 (15 Ulices 2024 21:32)  HR: 73 (16 Jan 2024 07:55) (73 - 99)  BP: 170/82 (16 Jan 2024 07:55) (115/74 - 170/82)  BP(mean): --  RR: 18 (16 Jan 2024 07:55) (18 - 18)  SpO2: 91% (16 Jan 2024 07:55) (91% - 93%)    Parameters below as of 16 Jan 2024 07:55  Patient On (Oxygen Delivery Method): room air        MEDICATIONS  (STANDING):  atorvastatin 40 milliGRAM(s) Oral at bedtime  escitalopram 10 milliGRAM(s) Oral daily  heparin   Injectable 5000 Unit(s) SubCutaneous every 12 hours  influenza  Vaccine (HIGH DOSE) 0.7 milliLiter(s) IntraMuscular once  levothyroxine 50 MICROGram(s) Oral daily  metoprolol tartrate 25 milliGRAM(s) Oral two times a day    MEDICATIONS  (PRN):  acetaminophen     Tablet .. 650 milliGRAM(s) Oral every 6 hours PRN Temp greater or equal to 38C (100.4F), Mild Pain (1 - 3)  aluminum hydroxide/magnesium hydroxide/simethicone Suspension 30 milliLiter(s) Oral every 4 hours PRN Dyspepsia  melatonin 3 milliGRAM(s) Oral at bedtime PRN Insomnia  ondansetron Injectable 4 milliGRAM(s) IV Push every 8 hours PRN Nausea and/or Vomiting  traMADol 50 milliGRAM(s) Oral every 4 hours PRN Moderate Pain (4 - 6)      PHYSICAL EXAM:  Constitutional: Awake / alert  HEENT: PERRL, EOMI  Neck: Supple  Respiratory: Breath sounds are clear bilaterally  Cardiovascular: S1 and S2, regular rhythm  Gastrointestinal: Soft, NT/ND  Extremities:  mild nonpitting edema, + L ankle swelling (chronic)  Musculoskeletal: + TTP thoracolumbar midline, right lumbar paraspinals  Skin: No rashes    Neurological Exam:  HF: A x O x 3, follows commands, normal affect  CN: PERRL, EOMI, no NLFD, tongue midline  Motor: Strength 5/5 except R QD 4/5  Sens: Intact to light touch  Reflexes: Symmetric/depressed, no clonus, no Espinosa's   Coord:  No FNFA, dysmetria  Gait/Balance: Cannot test        LABS:                         14.2   8.15  )-----------( 254      ( 14 Jan 2024 20:09 )             39.7     01-14    134<L>  |  102  |  11  ----------------------------<  85  4.5   |  23  |  0.59    Ca    8.8      14 Jan 2024 20:09    TPro  7.0  /  Alb  3.3  /  TBili  1.5<H>  /  DBili  x   /  AST  41<H>  /  ALT  37  /  AlkPhos  77  01-14    LIVER FUNCTIONS - ( 14 Jan 2024 20:09 )  Alb: 3.3 g/dL / Pro: 7.0 gm/dL / ALK PHOS: 77 U/L / ALT: 37 U/L / AST: 41 U/L / GGT: x                 RADIOLOGY:  MR Thoracic/Lumbar Spine No Cont (01.15.24 @ 14:14)  IMPRESSION: Degenerative changes involving the thoracic and lumbar spine   region.    Severe compression fracture involving the T12 is identified.    There is evidence of abnormal signal with suspected fracture seen   involving the L1-L2 and L3 vertebral bodies. These are likely compatible   with acute fractures and associated edema.    Retropulsed fragments are identified as described above.   HPI:  74 y/o female with PMHx of HTN, Osteopenia, Depression, Cognitive impairment, Hyponatremia, ETOH abuse presents to the ED c/o right lower lack/buttock/hip pain that started last week. Pt reports she slipped and fell last Monday, then couple days later tripped and did nearly a split getting out of recliner. Pt daily drinker, having difficulty ambulating without assistance. Pt denies head trauma, denies anticoagulation usage. Pt moves all extremities denies saddle type anesthesia or terrence-anal anesthesia.  Report regular bowel and bladder events.  Ambulation & transitions causes significant LBP limiting her mobility past couple days.    1/16: Patient seen and examined this AM, no acute events overnight. MRI reviewed with patient and Daughter at bedside. + TLSO at bedside. Continues to report R sided LBP, denies numb/tingling, + weakness unchanged which she reports in both legs. C/o knee pain b/l due to her falls, pain improving.    Vital Signs Last 24 Hrs  T(C): 36.6 (16 Jan 2024 07:55), Max: 36.8 (15 Ulices 2024 21:32)  T(F): 97.9 (16 Jan 2024 07:55), Max: 98.3 (15 Ulices 2024 21:32)  HR: 73 (16 Jan 2024 07:55) (73 - 99)  BP: 170/82 (16 Jan 2024 07:55) (115/74 - 170/82)  BP(mean): --  RR: 18 (16 Jan 2024 07:55) (18 - 18)  SpO2: 91% (16 Jan 2024 07:55) (91% - 93%)    Parameters below as of 16 Jan 2024 07:55  Patient On (Oxygen Delivery Method): room air        MEDICATIONS  (STANDING):  atorvastatin 40 milliGRAM(s) Oral at bedtime  escitalopram 10 milliGRAM(s) Oral daily  heparin   Injectable 5000 Unit(s) SubCutaneous every 12 hours  influenza  Vaccine (HIGH DOSE) 0.7 milliLiter(s) IntraMuscular once  levothyroxine 50 MICROGram(s) Oral daily  metoprolol tartrate 25 milliGRAM(s) Oral two times a day    MEDICATIONS  (PRN):  acetaminophen     Tablet .. 650 milliGRAM(s) Oral every 6 hours PRN Temp greater or equal to 38C (100.4F), Mild Pain (1 - 3)  aluminum hydroxide/magnesium hydroxide/simethicone Suspension 30 milliLiter(s) Oral every 4 hours PRN Dyspepsia  melatonin 3 milliGRAM(s) Oral at bedtime PRN Insomnia  ondansetron Injectable 4 milliGRAM(s) IV Push every 8 hours PRN Nausea and/or Vomiting  traMADol 50 milliGRAM(s) Oral every 4 hours PRN Moderate Pain (4 - 6)      PHYSICAL EXAM:  Constitutional: Awake / alert  HEENT: PERRL, EOMI  Neck: Supple  Respiratory: Breath sounds are clear bilaterally  Cardiovascular: S1 and S2, regular rhythm  Gastrointestinal: Soft, NT/ND  Extremities:  mild nonpitting edema, + L ankle swelling (chronic)  Musculoskeletal: + TTP thoracolumbar midline, right lumbar paraspinals  Skin: No rashes    Neurological Exam:  HF: A x O x 3, follows commands, normal affect  CN: PERRL, EOMI, no NLFD, tongue midline  Motor: Strength 5/5 except R QD 4/5  Sens: Intact to light touch  Reflexes: Symmetric/depressed, no clonus, no Espinosa's   Coord:  No FNFA, dysmetria  Gait/Balance: Cannot test        LABS:                         14.2   8.15  )-----------( 254      ( 14 Jan 2024 20:09 )             39.7     01-14    134<L>  |  102  |  11  ----------------------------<  85  4.5   |  23  |  0.59    Ca    8.8      14 Jan 2024 20:09    TPro  7.0  /  Alb  3.3  /  TBili  1.5<H>  /  DBili  x   /  AST  41<H>  /  ALT  37  /  AlkPhos  77  01-14    LIVER FUNCTIONS - ( 14 Jan 2024 20:09 )  Alb: 3.3 g/dL / Pro: 7.0 gm/dL / ALK PHOS: 77 U/L / ALT: 37 U/L / AST: 41 U/L / GGT: x                 RADIOLOGY:  MR Thoracic/Lumbar Spine No Cont (01.15.24 @ 14:14)  IMPRESSION: Degenerative changes involving the thoracic and lumbar spine   region.    Severe compression fracture involving the T12 is identified.    There is evidence of abnormal signal with suspected fracture seen   involving the L1-L2 and L3 vertebral bodies. These are likely compatible   with acute fractures and associated edema.    Retropulsed fragments are identified as described above.

## 2024-01-16 NOTE — PROGRESS NOTE ADULT - SUBJECTIVE AND OBJECTIVE BOX
CHIEF COMPLAINT/INTERVAL HISTORY:    Patient is a 73y old  Female who presents with a chief complaint of back pain (15 Ulices 2024 08:03)      HPI:  72 y/o female with PMHx of EtOH abuse, hyponatremia, cognitive impairment, HTN, HLD; presents to the ED s/p x2 falls, 1/10 fell onto R knee which was improving but on 1/12 had another fall, after her foot got caught and she tripped, fell on R hip. Patient c/o R hip pain, has difficulty standing upright and unable to ambulate. Per daughter at bedside, patient lives alone, now is unable to do daily tasks by herself and requires assistance moving around, has not been eating or hydrating well. Reported EtOH use daily last consumption yesterday, a bottle of wine. Patient x2 falls, unable to stand on her own or self-ambulate.      SUBJECTIVE & OBJECTIVE: Pt seen and examined at bedside.     ICU Vital Signs Last 24 Hrs  T(C): 36.8 (15 Ulices 2024 21:32), Max: 36.8 (15 Ulices 2024 21:32)  T(F): 98.3 (15 Ulices 2024 21:32), Max: 98.3 (15 Ulices 2024 21:32)  HR: 87 (15 Ulices 2024 21:32) (86 - 99)  BP: 125/78 (15 Ulices 2024 21:32) (115/74 - 130/77)  BP(mean): --  ABP: --  ABP(mean): --  RR: 18 (15 Ulices 2024 21:32) (18 - 18)  SpO2: 92% (15 Ulices 2024 21:32) (92% - 94%)    O2 Parameters below as of 15 Ulices 2024 21:32  Patient On (Oxygen Delivery Method): room air              MEDICATIONS  (STANDING):  atorvastatin 40 milliGRAM(s) Oral at bedtime  escitalopram 10 milliGRAM(s) Oral daily  heparin   Injectable 5000 Unit(s) SubCutaneous every 12 hours  influenza  Vaccine (HIGH DOSE) 0.7 milliLiter(s) IntraMuscular once  levothyroxine 50 MICROGram(s) Oral daily  metoprolol tartrate 25 milliGRAM(s) Oral two times a day    MEDICATIONS  (PRN):  acetaminophen     Tablet .. 650 milliGRAM(s) Oral every 6 hours PRN Temp greater or equal to 38C (100.4F), Mild Pain (1 - 3)  aluminum hydroxide/magnesium hydroxide/simethicone Suspension 30 milliLiter(s) Oral every 4 hours PRN Dyspepsia  melatonin 3 milliGRAM(s) Oral at bedtime PRN Insomnia  ondansetron Injectable 4 milliGRAM(s) IV Push every 8 hours PRN Nausea and/or Vomiting  traMADol 50 milliGRAM(s) Oral every 4 hours PRN Moderate Pain (4 - 6)        PHYSICAL EXAM:      NERVOUS SYSTEM:  Alert & Oriented X3, Motor Strength 5/5 B/L upper and lower extremities; DTRs 2+ intact and symmetric  CHEST/LUNG: Clear to auscultation bilaterally; No rales, rhonchi, wheezing, or rubs  HEART: Regular rate and rhythm; No murmurs, rubs, or gallops  ABDOMEN: Soft, Nontender, Nondistended; Bowel sounds present  EXTREMITIES:  2+ Peripheral Pulses, No clubbing, cyanosis, or edema    LABS:                        14.2   8.15  )-----------( 254      ( 14 Jan 2024 20:09 )             39.7     01-14    134<L>  |  102  |  11  ----------------------------<  85  4.5   |  23  |  0.59    Ca    8.8      14 Jan 2024 20:09    TPro  7.0  /  Alb  3.3  /  TBili  1.5<H>  /  DBili  x   /  AST  41<H>  /  ALT  37  /  AlkPhos  77  01-14    PT/INR - ( 14 Jan 2024 20:09 )   PT: 11.6 sec;   INR: 1.03 ratio         PTT - ( 14 Jan 2024 20:09 )  PTT:27.5 sec  Urinalysis Basic - ( 14 Jan 2024 20:09 )    Color: x / Appearance: x / SG: x / pH: x  Gluc: 85 mg/dL / Ketone: x  / Bili: x / Urobili: x   Blood: x / Protein: x / Nitrite: x   Leuk Esterase: x / RBC: x / WBC x   Sq Epi: x / Non Sq Epi: x / Bacteria: x        RADIOLOGY & ADDITIONAL TESTS: personally reviewed      < from: MR Thoracic Spine No Cont (01.15.24 @ 14:13) >  IMPRESSION: Degenerative changes involving the thoracic and lumbar spine   region.    Severe compression fracture involving the T12 is identified.    There is evidence of abnormal signal with suspected fracture seen   involving the L1-L2 and L3 vertebral bodies. These are likely compatible   with acute fractures and associated edema.    Retropulsed fragments are identified as described above.    s/p fall and L2 L3 vertebral fracture; MRI shows T12 fx also  prn analgesia  brace   monitor for etoh wd, no signs of wd    * HTN  metoprolol      PT and rehab     CHIEF COMPLAINT/INTERVAL HISTORY:    Patient is a 73y old  Female who presents with a chief complaint of back pain (15 Ulices 2024 08:03)      HPI:  74 y/o female with PMHx of EtOH abuse, hyponatremia, cognitive impairment, HTN, HLD; presents to the ED s/p x2 falls, 1/10 fell onto R knee which was improving but on 1/12 had another fall, after her foot got caught and she tripped, fell on R hip. Patient c/o R hip pain, has difficulty standing upright and unable to ambulate. Per daughter at bedside, patient lives alone, now is unable to do daily tasks by herself and requires assistance moving around, has not been eating or hydrating well. Reported EtOH use daily last consumption yesterday, a bottle of wine. Patient x2 falls, unable to stand on her own or self-ambulate.      SUBJECTIVE & OBJECTIVE: Pt seen and examined at bedside.     ICU Vital Signs Last 24 Hrs  T(C): 36.8 (15 Ulices 2024 21:32), Max: 36.8 (15 Ulices 2024 21:32)  T(F): 98.3 (15 Ulices 2024 21:32), Max: 98.3 (15 Ulices 2024 21:32)  HR: 87 (15 Ulices 2024 21:32) (86 - 99)  BP: 125/78 (15 Ulices 2024 21:32) (115/74 - 130/77)  BP(mean): --  ABP: --  ABP(mean): --  RR: 18 (15 Ulices 2024 21:32) (18 - 18)  SpO2: 92% (15 Ulices 2024 21:32) (92% - 94%)    O2 Parameters below as of 15 Ulices 2024 21:32  Patient On (Oxygen Delivery Method): room air              MEDICATIONS  (STANDING):  atorvastatin 40 milliGRAM(s) Oral at bedtime  escitalopram 10 milliGRAM(s) Oral daily  heparin   Injectable 5000 Unit(s) SubCutaneous every 12 hours  influenza  Vaccine (HIGH DOSE) 0.7 milliLiter(s) IntraMuscular once  levothyroxine 50 MICROGram(s) Oral daily  metoprolol tartrate 25 milliGRAM(s) Oral two times a day    MEDICATIONS  (PRN):  acetaminophen     Tablet .. 650 milliGRAM(s) Oral every 6 hours PRN Temp greater or equal to 38C (100.4F), Mild Pain (1 - 3)  aluminum hydroxide/magnesium hydroxide/simethicone Suspension 30 milliLiter(s) Oral every 4 hours PRN Dyspepsia  melatonin 3 milliGRAM(s) Oral at bedtime PRN Insomnia  ondansetron Injectable 4 milliGRAM(s) IV Push every 8 hours PRN Nausea and/or Vomiting  traMADol 50 milliGRAM(s) Oral every 4 hours PRN Moderate Pain (4 - 6)        PHYSICAL EXAM:      NERVOUS SYSTEM:  Alert & Oriented X3, Motor Strength 5/5 B/L upper and lower extremities; DTRs 2+ intact and symmetric  CHEST/LUNG: Clear to auscultation bilaterally; No rales, rhonchi, wheezing, or rubs  HEART: Regular rate and rhythm; No murmurs, rubs, or gallops  ABDOMEN: Soft, Nontender, Nondistended; Bowel sounds present  EXTREMITIES:  2+ Peripheral Pulses, No clubbing, cyanosis, or edema    LABS:                        14.2   8.15  )-----------( 254      ( 14 Jan 2024 20:09 )             39.7     01-14    134<L>  |  102  |  11  ----------------------------<  85  4.5   |  23  |  0.59    Ca    8.8      14 Jan 2024 20:09    TPro  7.0  /  Alb  3.3  /  TBili  1.5<H>  /  DBili  x   /  AST  41<H>  /  ALT  37  /  AlkPhos  77  01-14    PT/INR - ( 14 Jan 2024 20:09 )   PT: 11.6 sec;   INR: 1.03 ratio         PTT - ( 14 Jan 2024 20:09 )  PTT:27.5 sec  Urinalysis Basic - ( 14 Jan 2024 20:09 )    Color: x / Appearance: x / SG: x / pH: x  Gluc: 85 mg/dL / Ketone: x  / Bili: x / Urobili: x   Blood: x / Protein: x / Nitrite: x   Leuk Esterase: x / RBC: x / WBC x   Sq Epi: x / Non Sq Epi: x / Bacteria: x        RADIOLOGY & ADDITIONAL TESTS: personally reviewed      < from: MR Thoracic Spine No Cont (01.15.24 @ 14:13) >  IMPRESSION: Degenerative changes involving the thoracic and lumbar spine   region.    Severe compression fracture involving the T12 is identified.    There is evidence of abnormal signal with suspected fracture seen   involving the L1-L2 and L3 vertebral bodies. These are likely compatible   with acute fractures and associated edema.    Retropulsed fragments are identified as described above.    s/p fall and L2 L3 vertebral fracture; MRI shows T12 fx also  prn analgesia  brace   monitor for etoh wd, no signs of wd    * HTN  metoprolol      PT and rehab     CHIEF COMPLAINT/INTERVAL HISTORY:    Patient is a 73y old  Female who presents with a chief complaint of back pain (15 Ulices 2024 08:03)      HPI:  74 y/o female with PMHx of EtOH abuse, hyponatremia, cognitive impairment, HTN, HLD; presents to the ED s/p x2 falls, 1/10 fell onto R knee which was improving but on 1/12 had another fall, after her foot got caught and she tripped, fell on R hip. Patient c/o R hip pain, has difficulty standing upright and unable to ambulate. Per daughter at bedside, patient lives alone, now is unable to do daily tasks by herself and requires assistance moving around, has not been eating or hydrating well. Reported EtOH use daily last consumption yesterday, a bottle of wine. Patient x2 falls, unable to stand on her own or self-ambulate.      SUBJECTIVE & OBJECTIVE: Pt seen and examined at bedside. Was unable to participate in PT due to pain    ICU Vital Signs Last 24 Hrs  T(C): 36.8 (15 Ulices 2024 21:32), Max: 36.8 (15 Ulices 2024 21:32)  T(F): 98.3 (15 Ulices 2024 21:32), Max: 98.3 (15 Ulices 2024 21:32)  HR: 87 (15 Ulices 2024 21:32) (86 - 99)  BP: 125/78 (15 Ulices 2024 21:32) (115/74 - 130/77)  BP(mean): --  ABP: --  ABP(mean): --  RR: 18 (15 Ulices 2024 21:32) (18 - 18)  SpO2: 92% (15 Ulices 2024 21:32) (92% - 94%)    O2 Parameters below as of 15 Ulices 2024 21:32  Patient On (Oxygen Delivery Method): room air              MEDICATIONS  (STANDING):  atorvastatin 40 milliGRAM(s) Oral at bedtime  escitalopram 10 milliGRAM(s) Oral daily  heparin   Injectable 5000 Unit(s) SubCutaneous every 12 hours  influenza  Vaccine (HIGH DOSE) 0.7 milliLiter(s) IntraMuscular once  levothyroxine 50 MICROGram(s) Oral daily  metoprolol tartrate 25 milliGRAM(s) Oral two times a day    MEDICATIONS  (PRN):  acetaminophen     Tablet .. 650 milliGRAM(s) Oral every 6 hours PRN Temp greater or equal to 38C (100.4F), Mild Pain (1 - 3)  aluminum hydroxide/magnesium hydroxide/simethicone Suspension 30 milliLiter(s) Oral every 4 hours PRN Dyspepsia  melatonin 3 milliGRAM(s) Oral at bedtime PRN Insomnia  ondansetron Injectable 4 milliGRAM(s) IV Push every 8 hours PRN Nausea and/or Vomiting  traMADol 50 milliGRAM(s) Oral every 4 hours PRN Moderate Pain (4 - 6)        PHYSICAL EXAM:      NERVOUS SYSTEM:  Alert & Oriented X3, Motor Strength 5/5 B/L upper and lower extremities; DTRs 2+ intact and symmetric  CHEST/LUNG: Clear to auscultation bilaterally; No rales, rhonchi, wheezing, or rubs  HEART: Regular rate and rhythm; No murmurs, rubs, or gallops  ABDOMEN: Soft, Nontender, Nondistended; Bowel sounds present  EXTREMITIES:  2+ Peripheral Pulses, No clubbing, cyanosis, or edema    LABS:                        14.2   8.15  )-----------( 254      ( 14 Jan 2024 20:09 )             39.7     01-14    134<L>  |  102  |  11  ----------------------------<  85  4.5   |  23  |  0.59    Ca    8.8      14 Jan 2024 20:09    TPro  7.0  /  Alb  3.3  /  TBili  1.5<H>  /  DBili  x   /  AST  41<H>  /  ALT  37  /  AlkPhos  77  01-14    PT/INR - ( 14 Jan 2024 20:09 )   PT: 11.6 sec;   INR: 1.03 ratio         PTT - ( 14 Jan 2024 20:09 )  PTT:27.5 sec  Urinalysis Basic - ( 14 Jan 2024 20:09 )    Color: x / Appearance: x / SG: x / pH: x  Gluc: 85 mg/dL / Ketone: x  / Bili: x / Urobili: x   Blood: x / Protein: x / Nitrite: x   Leuk Esterase: x / RBC: x / WBC x   Sq Epi: x / Non Sq Epi: x / Bacteria: x        RADIOLOGY & ADDITIONAL TESTS: personally reviewed      < from: MR Thoracic Spine No Cont (01.15.24 @ 14:13) >  IMPRESSION: Degenerative changes involving the thoracic and lumbar spine   region.    Severe compression fracture involving the T12 is identified.    There is evidence of abnormal signal with suspected fracture seen   involving the L1-L2 and L3 vertebral bodies. These are likely compatible   with acute fractures and associated edema.    Retropulsed fragments are identified as described above.    s/p fall and L2 L3 vertebral fracture; MRI shows T12 fx also  prn analgesia: add Percocet, IV Tylenol, Neurontin and Lido  brace   monitor for etoh wd, no signs of wd    * HTN  metoprolol    Will need rehab case d/w daughter not safe home alone     CHIEF COMPLAINT/INTERVAL HISTORY:    Patient is a 73y old  Female who presents with a chief complaint of back pain (15 Ulicse 2024 08:03)      HPI:  74 y/o female with PMHx of EtOH abuse, hyponatremia, cognitive impairment, HTN, HLD; presents to the ED s/p x2 falls, 1/10 fell onto R knee which was improving but on 1/12 had another fall, after her foot got caught and she tripped, fell on R hip. Patient c/o R hip pain, has difficulty standing upright and unable to ambulate. Per daughter at bedside, patient lives alone, now is unable to do daily tasks by herself and requires assistance moving around, has not been eating or hydrating well. Reported EtOH use daily last consumption yesterday, a bottle of wine. Patient x2 falls, unable to stand on her own or self-ambulate.      SUBJECTIVE & OBJECTIVE: Pt seen and examined at bedside. Was unable to participate in PT due to pain    ICU Vital Signs Last 24 Hrs  T(C): 36.8 (15 Ulices 2024 21:32), Max: 36.8 (15 Ulices 2024 21:32)  T(F): 98.3 (15 Ulices 2024 21:32), Max: 98.3 (15 Ulices 2024 21:32)  HR: 87 (15 Ulices 2024 21:32) (86 - 99)  BP: 125/78 (15 Ulices 2024 21:32) (115/74 - 130/77)  BP(mean): --  ABP: --  ABP(mean): --  RR: 18 (15 Ulices 2024 21:32) (18 - 18)  SpO2: 92% (15 Ulices 2024 21:32) (92% - 94%)    O2 Parameters below as of 15 Ulices 2024 21:32  Patient On (Oxygen Delivery Method): room air              MEDICATIONS  (STANDING):  atorvastatin 40 milliGRAM(s) Oral at bedtime  escitalopram 10 milliGRAM(s) Oral daily  heparin   Injectable 5000 Unit(s) SubCutaneous every 12 hours  influenza  Vaccine (HIGH DOSE) 0.7 milliLiter(s) IntraMuscular once  levothyroxine 50 MICROGram(s) Oral daily  metoprolol tartrate 25 milliGRAM(s) Oral two times a day    MEDICATIONS  (PRN):  acetaminophen     Tablet .. 650 milliGRAM(s) Oral every 6 hours PRN Temp greater or equal to 38C (100.4F), Mild Pain (1 - 3)  aluminum hydroxide/magnesium hydroxide/simethicone Suspension 30 milliLiter(s) Oral every 4 hours PRN Dyspepsia  melatonin 3 milliGRAM(s) Oral at bedtime PRN Insomnia  ondansetron Injectable 4 milliGRAM(s) IV Push every 8 hours PRN Nausea and/or Vomiting  traMADol 50 milliGRAM(s) Oral every 4 hours PRN Moderate Pain (4 - 6)        PHYSICAL EXAM:      NERVOUS SYSTEM:  Alert & Oriented X3, Motor Strength 5/5 B/L upper and lower extremities; DTRs 2+ intact and symmetric  CHEST/LUNG: Clear to auscultation bilaterally; No rales, rhonchi, wheezing, or rubs  HEART: Regular rate and rhythm; No murmurs, rubs, or gallops  ABDOMEN: Soft, Nontender, Nondistended; Bowel sounds present  EXTREMITIES:  2+ Peripheral Pulses, No clubbing, cyanosis, or edema    LABS:                        14.2   8.15  )-----------( 254      ( 14 Jan 2024 20:09 )             39.7     01-14    134<L>  |  102  |  11  ----------------------------<  85  4.5   |  23  |  0.59    Ca    8.8      14 Jan 2024 20:09    TPro  7.0  /  Alb  3.3  /  TBili  1.5<H>  /  DBili  x   /  AST  41<H>  /  ALT  37  /  AlkPhos  77  01-14    PT/INR - ( 14 Jan 2024 20:09 )   PT: 11.6 sec;   INR: 1.03 ratio         PTT - ( 14 Jan 2024 20:09 )  PTT:27.5 sec  Urinalysis Basic - ( 14 Jan 2024 20:09 )    Color: x / Appearance: x / SG: x / pH: x  Gluc: 85 mg/dL / Ketone: x  / Bili: x / Urobili: x   Blood: x / Protein: x / Nitrite: x   Leuk Esterase: x / RBC: x / WBC x   Sq Epi: x / Non Sq Epi: x / Bacteria: x        RADIOLOGY & ADDITIONAL TESTS: personally reviewed      < from: MR Thoracic Spine No Cont (01.15.24 @ 14:13) >  IMPRESSION: Degenerative changes involving the thoracic and lumbar spine   region.    Severe compression fracture involving the T12 is identified.    There is evidence of abnormal signal with suspected fracture seen   involving the L1-L2 and L3 vertebral bodies. These are likely compatible   with acute fractures and associated edema.    Retropulsed fragments are identified as described above.    s/p fall and L2 L3 vertebral fracture; MRI shows T12 fx also  prn analgesia: add Percocet, IV Tylenol, Neurontin and Lido  brace   monitor for etoh wd, no signs of wd    * HTN  metoprolol    Will need rehab case d/w daughter not safe home alone

## 2024-01-16 NOTE — PHYSICAL THERAPY INITIAL EVALUATION ADULT - PHYSICAL ASSIST/NONPHYSICAL ASSIST: SCOOT/BRIDGE, REHAB EVAL
Render Risk Assessment In Note?: no Detail Level: Zone Additional Notes: Prepaid for 18 units to be given at follow-up visit because we were out of syringes but there was a special going on. Payment is not accurate on [employer] but is correct on [employer]. Plan for 9 units on the left orbicularis oculi and 9 on the right to correct crows feet. \\n\\nLeft nasalis might need complimentary 1 unit touch up.\\n\\nPatient also interested in lip filler. verbal cues/1 person assist

## 2024-01-16 NOTE — PHYSICAL THERAPY INITIAL EVALUATION ADULT - PERTINENT HX OF CURRENT PROBLEM, REHAB EVAL
74 y/o female with PMHx of presents to the ED c/o right lower lack/buttock/hip pain that started last week. Pt reports she slipped and did nearly a split, reports several days later she was getting out of recliner. Pt daily drinker, having difficulty ambulating without assistance. Pt denies head trauma, denies anticoagulation usage. Pt moves all extremeties, denies saddle type anesthesia or terrence-anal anesthesia.  report regular bowel and bladder events.  Ambulation & transitions causes significant LBP limiting her mobility past couple days. Reported EtOH use daily last consumption yesterday, a bottle of wine. Patient x2 falls, unable to stand on her own or self-ambulate.    CTH T/L spine: chronic T12 comp fxr with retropulsion, suspect new L2 & L3 comp fxr's  TLSO OOB 74 y/o female with PMHx of presents to the ED c/o right lower lack/buttock/hip pain that started last week. Pt reports she slipped and did nearly a split, reports several days later she was getting out of recliner. Pt daily drinker, having difficulty ambulating without assistance. Pt denies head trauma, denies anticoagulation usage. Pt moves all extremities, denies saddle type anesthesia or terrence-anal anesthesia.  report regular bowel and bladder events.  Ambulation & transitions causes significant LBP limiting her mobility past couple days. Reported EtOH use daily last consumption yesterday, a bottle of wine. Patient x2 falls, unable to stand on her own or self-ambulate.    CTH T/L spine: chronic T12 comp fxr with retropulsion, suspect new L2 & L3 comp fxr's  TLSO OOB

## 2024-01-16 NOTE — PHYSICAL THERAPY INITIAL EVALUATION ADULT - GENERAL OBSERVATIONS, REHAB EVAL
Pre and post session: supine in bed with bed alarm on. Call bell and phone in reach. C/o 7/10 pain resting and had already received pain meds an hour ago. C/o 10/10 pain with mobility. Nursing made aware. Would benefit from further skilled PT for functional mobility training.

## 2024-01-16 NOTE — PHYSICAL THERAPY INITIAL EVALUATION ADULT - ADDITIONAL COMMENTS
States lives in [facility] with no FROY. Drives and uses cane PRN. Dtr lives closeby and is single mother of 2 and has stated cannot assist pt physically if she is dc'd home. Son lives in [place] but states cannot assist pt upon dc. has . States lives in [facility] with no FROY. Drives and uses cane PRN. Dtr lives closeby and is single mother of 2 and has stated cannot assist pt physically if she is dc'd home. Son lives in [place] but states cannot assist pt upon dc. has .

## 2024-01-16 NOTE — PROGRESS NOTE ADULT - ASSESSMENT
74 y/o female with PMHx of presents to the ED c/o right lower lack/buttock/hip pain that started last week. Pt reports she slipped and did nearly a split, reports several days later she was getting out of recliner. Pt daily drinker, having difficulty ambulating without assistance. Pt denies head trauma, denies anticoagulation usage. Pt moves all extremities denies saddle type anesthesia or terrence-anal anesthesia.  report regular bowel and bladder events.  Ambulation & transitions causes significant LBP limiting her mobility past couple days.    - MRI T/L Spine reviewed Chronic, severe compression fx T12 with retropulsion, acute L1-L3 compression fractures.   - PT/OOB with TLSO brace as tolerated  - TLSO brace when OOB or if HOB >30 degrees  - Standing Xrays when patient able to tolerate  - CTH negative  - DVT ppx SQH, SCDs    D/w Dr. Matute     72 y/o female with PMHx of presents to the ED c/o right lower lack/buttock/hip pain that started last week. Pt reports she slipped and did nearly a split, reports several days later she was getting out of recliner. Pt daily drinker, having difficulty ambulating without assistance. Pt denies head trauma, denies anticoagulation usage. Pt moves all extremities denies saddle type anesthesia or terrence-anal anesthesia.  report regular bowel and bladder events.  Ambulation & transitions causes significant LBP limiting her mobility past couple days.    - MRI T/L Spine reviewed Chronic, severe compression fx T12 with retropulsion, acute L1-L3 compression fractures.   - PT/OOB with TLSO brace as tolerated  - TLSO brace when OOB or if HOB >30 degrees  - Standing Xrays when patient able to tolerate  - CTH negative  - DVT ppx SQH, SCDs    D/w Dr. Matute     72 y/o female with PMHx of presents to the ED c/o right lower lack/buttock/hip pain that started last week. Pt reports she slipped and did nearly a split, reports several days later she was getting out of recliner. Pt daily drinker, having difficulty ambulating without assistance. Pt denies head trauma, denies anticoagulation usage. Pt moves all extremities denies saddle type anesthesia or terrence-anal anesthesia.  report regular bowel and bladder events.  Ambulation & transitions causes significant LBP limiting her mobility past couple days.    - MRI T/L Spine reviewed Chronic, severe compression fx T12 with retropulsion, acute L1-L3 compression fractures.   - Will review with Dr. Ramirez  - PT/OOB with TLSO brace as tolerated  - TLSO brace when OOB or if HOB >30 degrees  - Standing Xrays when patient able to tolerate  - CTH negative  - XR knees at patient and daughter request c/o knee pain s/p multiple falls  - DVT ppx SQH, SCDs    D/w Dr. Matute     74 y/o female with PMHx of presents to the ED c/o right lower lack/buttock/hip pain that started last week. Pt reports she slipped and did nearly a split, reports several days later she was getting out of recliner. Pt daily drinker, having difficulty ambulating without assistance. Pt denies head trauma, denies anticoagulation usage. Pt moves all extremities denies saddle type anesthesia or terrence-anal anesthesia.  report regular bowel and bladder events.  Ambulation & transitions causes significant LBP limiting her mobility past couple days.    - MRI T/L Spine reviewed Chronic, severe compression fx T12 with retropulsion, acute L1-L3 compression fractures.   - T12 compression fracture compared to Xray 2021, appears unchanged  - PT/OOB with LSO brace as tolerated  - LSO brace when OOB or if HOB >30 degrees  - Standing Xrays when patient able to tolerate  - CTH negative  - XR knees at patient and daughter request c/o knee pain s/p multiple falls  - DVT ppx SQH, SCDs    D/w Dr. Matute

## 2024-01-17 LAB
ANION GAP SERPL CALC-SCNC: 7 MMOL/L — SIGNIFICANT CHANGE UP (ref 5–17)
BUN SERPL-MCNC: 16 MG/DL — SIGNIFICANT CHANGE UP (ref 7–23)
CALCIUM SERPL-MCNC: 8.9 MG/DL — SIGNIFICANT CHANGE UP (ref 8.5–10.1)
CHLORIDE SERPL-SCNC: 105 MMOL/L — SIGNIFICANT CHANGE UP (ref 96–108)
CO2 SERPL-SCNC: 25 MMOL/L — SIGNIFICANT CHANGE UP (ref 22–31)
CREAT SERPL-MCNC: 0.68 MG/DL — SIGNIFICANT CHANGE UP (ref 0.5–1.3)
EGFR: 92 ML/MIN/1.73M2 — SIGNIFICANT CHANGE UP
GLUCOSE SERPL-MCNC: 97 MG/DL — SIGNIFICANT CHANGE UP (ref 70–99)
HCT VFR BLD CALC: 40.9 % — SIGNIFICANT CHANGE UP (ref 34.5–45)
HGB BLD-MCNC: 14.2 G/DL — SIGNIFICANT CHANGE UP (ref 11.5–15.5)
MCHC RBC-ENTMCNC: 34.3 PG — HIGH (ref 27–34)
MCHC RBC-ENTMCNC: 34.7 GM/DL — SIGNIFICANT CHANGE UP (ref 32–36)
MCV RBC AUTO: 98.8 FL — SIGNIFICANT CHANGE UP (ref 80–100)
PLATELET # BLD AUTO: 241 K/UL — SIGNIFICANT CHANGE UP (ref 150–400)
POTASSIUM SERPL-MCNC: 4 MMOL/L — SIGNIFICANT CHANGE UP (ref 3.5–5.3)
POTASSIUM SERPL-SCNC: 4 MMOL/L — SIGNIFICANT CHANGE UP (ref 3.5–5.3)
RBC # BLD: 4.14 M/UL — SIGNIFICANT CHANGE UP (ref 3.8–5.2)
RBC # FLD: 13.2 % — SIGNIFICANT CHANGE UP (ref 10.3–14.5)
SODIUM SERPL-SCNC: 137 MMOL/L — SIGNIFICANT CHANGE UP (ref 135–145)
WBC # BLD: 5.59 K/UL — SIGNIFICANT CHANGE UP (ref 3.8–10.5)
WBC # FLD AUTO: 5.59 K/UL — SIGNIFICANT CHANGE UP (ref 3.8–10.5)

## 2024-01-17 PROCEDURE — 99233 SBSQ HOSP IP/OBS HIGH 50: CPT

## 2024-01-17 PROCEDURE — 99231 SBSQ HOSP IP/OBS SF/LOW 25: CPT

## 2024-01-17 PROCEDURE — 72141 MRI NECK SPINE W/O DYE: CPT | Mod: 26

## 2024-01-17 RX ADMIN — Medication 25 MILLIGRAM(S): at 22:15

## 2024-01-17 RX ADMIN — GABAPENTIN 100 MILLIGRAM(S): 400 CAPSULE ORAL at 08:59

## 2024-01-17 RX ADMIN — ATORVASTATIN CALCIUM 40 MILLIGRAM(S): 80 TABLET, FILM COATED ORAL at 22:14

## 2024-01-17 RX ADMIN — ESCITALOPRAM OXALATE 10 MILLIGRAM(S): 10 TABLET, FILM COATED ORAL at 08:59

## 2024-01-17 RX ADMIN — HEPARIN SODIUM 5000 UNIT(S): 5000 INJECTION INTRAVENOUS; SUBCUTANEOUS at 09:00

## 2024-01-17 RX ADMIN — Medication 50 MICROGRAM(S): at 05:44

## 2024-01-17 RX ADMIN — LIDOCAINE 1 PATCH: 4 CREAM TOPICAL at 02:59

## 2024-01-17 RX ADMIN — GABAPENTIN 100 MILLIGRAM(S): 400 CAPSULE ORAL at 22:14

## 2024-01-17 RX ADMIN — Medication 1 MILLIGRAM(S): at 13:13

## 2024-01-17 RX ADMIN — Medication 25 MILLIGRAM(S): at 08:59

## 2024-01-17 RX ADMIN — HEPARIN SODIUM 5000 UNIT(S): 5000 INJECTION INTRAVENOUS; SUBCUTANEOUS at 22:15

## 2024-01-17 NOTE — PROGRESS NOTE ADULT - SUBJECTIVE AND OBJECTIVE BOX
CHIEF COMPLAINT/INTERVAL HISTORY:    Patient is a 73y old  Female who presents with a chief complaint of back pain (15 Ulices 2024 08:03)      HPI:  72 y/o female with PMHx of EtOH abuse, hyponatremia, cognitive impairment, HTN, HLD; presents to the ED s/p x2 falls, 1/10 fell onto R knee which was improving but on 1/12 had another fall, after her foot got caught and she tripped, fell on R hip. Patient c/o R hip pain, has difficulty standing upright and unable to ambulate. Per daughter at bedside, patient lives alone, now is unable to do daily tasks by herself and requires assistance moving around, has not been eating or hydrating well. Reported EtOH use daily last consumption yesterday, a bottle of wine. Patient x2 falls, unable to stand on her own or self-ambulate.      SUBJECTIVE & OBJECTIVE: Pt seen and examined at bedside. awaiting MRI of cspine. was not able to participate with PT secondary to pain.     Vital Signs Last 24 Hrs  T(C): 36.7 (17 Jan 2024 13:03), Max: 36.7 (16 Jan 2024 15:45)  T(F): 98 (17 Jan 2024 13:03), Max: 98 (16 Jan 2024 15:45)  HR: 68 (17 Jan 2024 13:03) (68 - 82)  BP: 156/82 (17 Jan 2024 13:03) (116/68 - 162/93)  BP(mean): 99 (16 Jan 2024 15:45) (99 - 99)  RR: 17 (17 Jan 2024 13:03) (17 - 18)  SpO2: 94% (17 Jan 2024 13:03) (92% - 95%)  PHYSICAL EXAM:      Constitutional: NAD  HEENT: PERRLA, EOMI, Normal Hearing  Neck: No LAD, No JVD  Back: Normal spine flexure, No CVA tenderness  Cardiovascular: S1 and S2, RRR, no M/G/R  Respiratory: CTAB  Gastrointestinal: BS+, soft, NT/ND  Extremities: No peripheral edema  Vascular: 2+ peripheral pulses  Neurological: A/O x 3, moves all extremities  Psychiatric: Normal mood, normal affect  Skin: No rashes    Parameters below as of 17 Jan 2024 13:03  Patient On (Oxygen Delivery Method): room air    PHYSICAL EXAM:    RADIOLOGY & ADDITIONAL TESTS: personally reviewed      < from: MR Thoracic Spine No Cont (01.15.24 @ 14:13) >  IMPRESSION: Degenerative changes involving the thoracic and lumbar spine   region.    Severe compression fracture involving the T12 is identified.    There is evidence of abnormal signal with suspected fracture seen   involving the L1-L2 and L3 vertebral bodies. These are likely compatible   with acute fractures and associated edema.    Retropulsed fragments are identified as described above.

## 2024-01-17 NOTE — PROGRESS NOTE ADULT - ASSESSMENT
74 yo female with:    #s/p mechanical fall   # L1-L3 compression fx  # chronic T12 compression fracture  # inability to ambulate  PT eval appreciated  neurosurgery following  prn analgesia: add Percocet, IV Tylenol, Neurontin and Lido  TLSO brace  MRIs reviewed  MRI c-spine pending    #h/o etoh abuse  monitor for etoh wd, no signs of wd    * HTN  metoprolol    dispo: MRI pending, plan for [facility] likely tomorrow

## 2024-01-17 NOTE — PROGRESS NOTE ADULT - SUBJECTIVE AND OBJECTIVE BOX
Neurosurgery:    HPI:  72 y/o female with PMHx of HTN, Osteopenia, Depression, Cognitive impairment, Hyponatremia, ETOH abuse presents to the ED c/o right lower lack/buttock/hip pain that started last week. Pt reports she slipped and fell last Monday, then couple days later tripped and did nearly a split getting out of recliner. Pt daily drinker, having difficulty ambulating without assistance. Pt denies head trauma, denies anticoagulation usage. Pt moves all extremities denies saddle type anesthesia or terrence-anal anesthesia.  Report regular bowel and bladder events.  Ambulation & transitions causes significant LBP limiting her mobility past couple days.    1/16: Patient seen and examined this AM, no acute events overnight. MRI reviewed with patient and Daughter at bedside. + TLSO at bedside. Continues to report R sided LBP, denies numb/tingling, + weakness unchanged which she reports in both legs. C/o knee pain b/l due to her falls, pain improving.    1/17: MR T/L spine reviewed.  Upper C/T spine stenosis of cervical / Th canal.  Will obtain focused MR of such.  MR of Lspine done. Juju d/w pt that canal intrusion of T12 fxr is not compressing the conus and CSF flow is still observed with no compression of neural elements.  MR of C/T junction region recommended as pt has history of falls.  Ok for DVT ppx / PT OOB with assist with brace.    MEDICATIONS  (STANDING):  acetaminophen   IVPB .. 1000 milliGRAM(s) IV Intermittent once  atorvastatin 40 milliGRAM(s) Oral at bedtime  escitalopram 10 milliGRAM(s) Oral daily  gabapentin 100 milliGRAM(s) Oral two times a day  heparin   Injectable 5000 Unit(s) SubCutaneous every 12 hours  influenza  Vaccine (HIGH DOSE) 0.7 milliLiter(s) IntraMuscular once  levothyroxine 50 MICROGram(s) Oral daily  lidocaine   4% Patch 1 Patch Transdermal daily  metoprolol tartrate 25 milliGRAM(s) Oral two times a day     Vital Signs Last 24 Hrs  T(C): 36.6 (17 Jan 2024 07:36), Max: 36.7 (16 Jan 2024 15:45)  T(F): 97.9 (17 Jan 2024 07:36), Max: 98 (16 Jan 2024 15:45)  HR: 71 (17 Jan 2024 07:36) (71 - 82)  BP: 158/78 (17 Jan 2024 07:36) (116/68 - 162/93)  BP(mean): 99 (16 Jan 2024 15:45) (99 - 99)  RR: 18 (17 Jan 2024 07:36) (17 - 18)  SpO2: 93% (17 Jan 2024 07:36) (92% - 95%)    Parameters below as of 17 Jan 2024 07:36  Patient On (Oxygen Delivery Method): room air    Radiology:  MRI Tspine  Disc desiccation is seen throughout the thoracic spine region.    T3-4: Bilateral hypertrophic facet joint changes are seen. Moderate   narrowing of the right neural foramen  T8-9: Bilateral hypertrophic facet joint changes seen. Moderate narrowing   of the left neural foramen  T9-10: Bilateral hypertrophic facet joint changes seen. Mild to moderate   narrowing of the right neural foramen  T10-11: Disc bulge and bilateral hypertrophic facet change seen. Moderate   to severe narrowing of the right neural foramen and severe narrowing of   the left neural foramen  T11-12: Bilateral hypertrophic facet joint changes seen. Mild narrowing   of left neural foramen and moderate narrowing of the right neural foramen.  T12-L1: Bilateral hypertrophic facet changes are seen. Moderate narrowing   of the left neural foramen and severe narrowing of the right neural   foramen    Lumbar spine:  Lumbar spine:    Loss of the normal lordosis is seen    Scoliosis    There is evidence of subtle T1-T2 prolongation involving the inferior   endplate of L1. While this could be the result of chronic degenerative   changes, this is more likely due to edema from trauma given that there is   a possible fracture seeninvolving the posterior inferior corner of this   vertebral body.    There is abnormal T1 and T2 prolongation involving the L2 vertebral body   with possible associated acute fracture identified.    Compression fracture is seen involving the L3 vertebral body with   associated T1-T2 prolongation. This likely compatible with an acute   compression fracture secondary to patient's known trauma. Mild   retropulsed fragment is seen causing mild narrowing of the spinal canal.    Disc desiccation is seen throughout the lumbar spine region secondary to   chronic degenerative changes.    L1-2: Disc bulge and bilateral hypertrophic facet change. Mild narrowing   of the spinal canal. Mild narrowing of the right neural foramen    L2-3: Disc bulge and bilateral hypertrophic facet joint changes seen.   Mild to moderate narrowing of the spinal canal.    L3-4: Disc bulge and bilateral hypertrophic facet joint changes. Mild to   moderate narrowing of the spinal canal. Moderate narrowing of both neural   foramen.    L4-5: Disc bulge and bilateral hypertrophic facet change seen. Mild to   moderate narrowing of the spinal canal. Mild narrowing of the right   neural foramen    L4-5: Grade 1 anterolisthesis is seen. Disc bulge and bilateral   hypertrophic facet joint changes seen. Mild narrowing of the spinal   canal. Mild narrowing of the right neural foramen    The conus ends at the bottom of L2 and appears normal.    Evaluation of the paraspinal soft tissues appear normal.    Both SI joints appear intact.    Physical Exam:  Constitutional: Awake / alert  HEENT: PERRLA, EOMI  Neck: Supple  Respiratory: Breath sounds are clear bilaterally  Cardiovascular: S1 and S2, regular rhythm  Gastrointestinal: Soft, NT/ND  Extremities:  no edema  Musculoskeletal: no abnormal movements  Skin: No rashes    Neurological Exam:  HF: A x O x 3, follows commands, normal affect, speech fluent  CN: PERRL, EOMI, no NLFD, tongue midline  UE: no hoffmans on exam, no hyper-reflexia  Motor: Strength 5/5 except R IP 3/5, QD/HS/DF/PF 4/5  Sens: Intact to light touch  Reflexes: Symmetric/depressed, no clonus, no Espinosa's   Coord:  No FNFA, dysmetria, JODIE intact   Gait/Balance: Cannot test      A/P:  72 y/o female with PMHx of presents to the ED c/o right lower lack/buttock/hip pain that started last week. Pt reports she slipped and did nearly a split, reports several days later she was getting out of recliner. Pt daily drinker, having difficulty ambulating without assistance. Pt denies head trauma, denies anticoagulation usage. Pt moves all extremities denies saddle type anesthesia or terrence-anal anesthesia.  report regular bowel and bladder events.  Ambulation & transitions causes significant LBP limiting her mobility past couple days.    - OOB mobilize PT eval with brace  - MRI T/L Spine reviewed Chronic, severe compression fx T12 with retropulsion but no compression of neural elements.  - MR Lspine: acute L1-L3 compression fractures.   - MR of upper Tspine shows Cervical / Thoracic junction spinal stenosis - Obtain new MRI with focus on such territory.  - No signs / symptoms of clonuse / hyper-reflexia / spasticity - However with falls will eval degree of C/T stenosis.  - Hx of EtOH use - could be contributing factor to recent falls with identified brain atrophy.  - T12 compression fracture compared to Xray 2021, appears unchanged  - PT/OOB with LSO brace as tolerated  - LSO brace when OOB or if HOB >30 degrees  - Standing Xrays when patient able to tolerate and reviewed - c/w priors  - CTH negative - atrophy  - XR knees: daughter request c/o knee pain s/p multiple falls - no fxr's / dislocation but bruising noted due to fall on PE.  - DVT ppx SQH, SCDs    D/w Dr. Matute  Care time:   45 minutes spent on total encounter; more than 50% of the visit was spent counseling and / or coordinating care by the Neurosurgical team.  While the remainder including evaluating the patient, medical record, imaging studies, and discussions with fellow staff members / patient / and or family.

## 2024-01-18 ENCOUNTER — TRANSCRIPTION ENCOUNTER (OUTPATIENT)
Age: 74
End: 2024-01-18

## 2024-01-18 VITALS
HEART RATE: 72 BPM | SYSTOLIC BLOOD PRESSURE: 165 MMHG | OXYGEN SATURATION: 94 % | DIASTOLIC BLOOD PRESSURE: 85 MMHG | RESPIRATION RATE: 17 BRPM | TEMPERATURE: 98 F

## 2024-01-18 LAB — SARS-COV-2 RNA SPEC QL NAA+PROBE: SIGNIFICANT CHANGE UP

## 2024-01-18 PROCEDURE — 99239 HOSP IP/OBS DSCHRG MGMT >30: CPT

## 2024-01-18 RX ORDER — GABAPENTIN 400 MG/1
1 CAPSULE ORAL
Qty: 0 | Refills: 0 | DISCHARGE
Start: 2024-01-18

## 2024-01-18 RX ORDER — ACETAMINOPHEN 500 MG
100 TABLET ORAL
Qty: 0 | Refills: 0 | DISCHARGE
Start: 2024-01-18

## 2024-01-18 RX ORDER — TRAMADOL HYDROCHLORIDE 50 MG/1
1 TABLET ORAL
Qty: 0 | Refills: 0 | DISCHARGE
Start: 2024-01-18

## 2024-01-18 RX ORDER — OXYCODONE AND ACETAMINOPHEN 5; 325 MG/1; MG/1
1 TABLET ORAL
Qty: 0 | Refills: 0 | DISCHARGE
Start: 2024-01-18

## 2024-01-18 RX ORDER — CYCLOBENZAPRINE HYDROCHLORIDE 10 MG/1
1 TABLET, FILM COATED ORAL
Qty: 0 | Refills: 0 | DISCHARGE
Start: 2024-01-18

## 2024-01-18 RX ORDER — ACETAMINOPHEN 500 MG
2 TABLET ORAL
Qty: 0 | Refills: 0 | DISCHARGE
Start: 2024-01-18

## 2024-01-18 RX ADMIN — ESCITALOPRAM OXALATE 10 MILLIGRAM(S): 10 TABLET, FILM COATED ORAL at 10:48

## 2024-01-18 RX ADMIN — Medication 25 MILLIGRAM(S): at 10:48

## 2024-01-18 RX ADMIN — Medication 50 MICROGRAM(S): at 06:10

## 2024-01-18 RX ADMIN — GABAPENTIN 100 MILLIGRAM(S): 400 CAPSULE ORAL at 10:44

## 2024-01-18 RX ADMIN — LIDOCAINE 1 PATCH: 4 CREAM TOPICAL at 10:44

## 2024-01-18 RX ADMIN — HEPARIN SODIUM 5000 UNIT(S): 5000 INJECTION INTRAVENOUS; SUBCUTANEOUS at 10:45

## 2024-01-18 NOTE — DISCHARGE NOTE PROVIDER - CARE PROVIDER_API CALL
Carlos Eduardo Matute  Neurosurgery  [address] [place] [id number]  Phone: [phone]  Fax: [phone]  Follow Up Time:

## 2024-01-18 NOTE — DISCHARGE NOTE PROVIDER - HOSPITAL COURSE
74 y/o female with PMHx of EtOH abuse, hyponatremia, cognitive impairment, HTN, HLD; presents to the ED s/p x2 falls, 1/10 fell onto R knee which was improving but on 1/12 had another fall, after her foot got caught and she tripped, fell on R hip. Patient c/o R hip pain, has difficulty standing upright and unable to ambulate. Per daughter at bedside, patient lives alone, now is unable to do daily tasks by herself and requires assistance moving around, has not been eating or hydrating well. Reported EtOH use daily last consumption yesterday, a bottle of wine. Patient x2 falls, unable to stand on her own or self-ambulate.    #s/p mechanical fall   # L1-L3 compression fx  # chronic T12 compression fracture  # inability to ambulate  PT eval appreciated  neurosurgery following  prn analgesia: add Percocet, IV Tylenol, Neurontin and Lido  TLSO brace  MRIs reviewed  MRI c-spine reviewed  stable for DC to rehab  FU with ortho outpatient

## 2024-01-18 NOTE — DISCHARGE NOTE NURSING/CASE MANAGEMENT/SOCIAL WORK - PATIENT PORTAL LINK FT
You can access the FollowMyHealth Patient Portal offered by [facility] by registering at the following website: [email or web address]. By joining [employer]’s FollowMyHealth portal, you will also be able to view your health information using other applications (apps) compatible with our system.

## 2024-01-18 NOTE — DISCHARGE NOTE PROVIDER - NSDCMRMEDTOKEN_GEN_ALL_CORE_FT
acetaminophen 10 mg/mL intravenous solution: 100 milliliter(s) intravenous once  acetaminophen 325 mg oral tablet: 2 tab(s) orally every 6 hours As needed Temp greater or equal to 38C (100.4F), Mild Pain (1 - 3)  atorvastatin 40 mg oral tablet: 1 tab(s) orally once a day (at bedtime)  cyclobenzaprine 5 mg oral tablet: 1 tab(s) orally 3 times a day As needed Muscle Spasm  escitalopram 10 mg oral tablet: 1 tab(s) orally once a day  fenofibric acid 135 mg oral delayed release capsule: 1 cap(s) orally once a day  gabapentin 100 mg oral capsule: 1 cap(s) orally 2 times a day  levothyroxine 50 mcg (0.05 mg) oral tablet: 1 tab(s) orally once a day  metoprolol tartrate 25 mg oral tablet: 1 tab(s) orally 2 times a day  oxycodone-acetaminophen 5 mg-325 mg oral tablet: 1 tab(s) orally every 4 hours As needed Severe Pain (7 - 10)  traMADol 50 mg oral tablet: 1 tab(s) orally every 4 hours As needed Moderate Pain (4 - 6)

## 2024-01-18 NOTE — DISCHARGE NOTE PROVIDER - NSDCACTIVITY_GEN_ALL_CORE
No restrictions Peng Advancement Flap Text: The defect edges were debeveled with a #15 scalpel blade.  Given the location of the defect, shape of the defect and the proximity to free margins a Peng advancement flap was deemed most appropriate.  Using a sterile surgical marker, an appropriate advancement flap was drawn incorporating the defect and placing the expected incisions within the relaxed skin tension lines where possible. The area thus outlined was incised deep to adipose tissue with a #15 scalpel blade.  The skin margins were undermined to an appropriate distance in all directions utilizing iris scissors.

## 2024-01-18 NOTE — PROGRESS NOTE ADULT - SUBJECTIVE AND OBJECTIVE BOX
Neurosurgery:    HPI:  74 y/o female with PMHx of HTN, Osteopenia, Depression, Cognitive impairment, Hyponatremia, ETOH abuse presents to the ED c/o right lower lack/buttock/hip pain that started last week. Pt reports she slipped and fell last Monday, then couple days later tripped and did nearly a split getting out of recliner. Pt daily drinker, having difficulty ambulating without assistance. Pt denies head trauma, denies anticoagulation usage. Pt moves all extremities denies saddle type anesthesia or terrence-anal anesthesia.  Report regular bowel and bladder events.  Ambulation & transitions causes significant LBP limiting her mobility past couple days.    1/16: Patient seen and examined this AM, no acute events overnight. MRI reviewed with patient and Daughter at bedside. + TLSO at bedside. Continues to report R sided LBP, denies numb/tingling, + weakness unchanged which she reports in both legs. C/o knee pain b/l due to her falls, pain improving.    1/17: MR T/L spine reviewed.  Upper C/T spine stenosis of cervical / Th canal.  Will obtain focused MR of such.  MR of Lspine done. Juju d/w pt that canal intrusion of T12 fxr is not compressing the conus and CSF flow is still observed with no compression of neural elements.  MR of C/T junction region recommended as pt has history of falls.  Ok for DVT ppx / PT OOB with assist with brace.    1/18 Patient seen with daughter at bedside, Dr Matute present.  MR imaging reviewed with patient and family. discussed treatment is non-operative and rehab with brace and follow-up in 4-6 weeks with Dr Matute appropriate.  Underlying ETOH dependence could be contributing to falls and likely chronic undiagnosed osteopenia.  Patient sees NP Anna Barbour.  Our team with touch base with PCP office for recommendations of dexa scan in the future and appropriate work-up along with counseling for underlying root causes of Etoh abuse.     MEDICATIONS  (STANDING):  acetaminophen   IVPB .. 1000 milliGRAM(s) IV Intermittent once  atorvastatin 40 milliGRAM(s) Oral at bedtime  escitalopram 10 milliGRAM(s) Oral daily  gabapentin 100 milliGRAM(s) Oral two times a day  heparin   Injectable 5000 Unit(s) SubCutaneous every 12 hours  influenza  Vaccine (HIGH DOSE) 0.7 milliLiter(s) IntraMuscular once  levothyroxine 50 MICROGram(s) Oral daily  lidocaine   4% Patch 1 Patch Transdermal daily  metoprolol tartrate 25 milliGRAM(s) Oral two times a day    MEDICATIONS  (PRN):  acetaminophen     Tablet .. 650 milliGRAM(s) Oral every 6 hours PRN Temp greater or equal to 38C (100.4F), Mild Pain (1 - 3)  aluminum hydroxide/magnesium hydroxide/simethicone Suspension 30 milliLiter(s) Oral every 4 hours PRN Dyspepsia  cyclobenzaprine 5 milliGRAM(s) Oral three times a day PRN Muscle Spasm  melatonin 3 milliGRAM(s) Oral at bedtime PRN Insomnia  ondansetron Injectable 4 milliGRAM(s) IV Push every 8 hours PRN Nausea and/or Vomiting  oxycodone    5 mG/acetaminophen 325 mG 1 Tablet(s) Oral every 4 hours PRN Severe Pain (7 - 10)  traMADol 50 milliGRAM(s) Oral every 4 hours PRN Moderate Pain (4 - 6)     Vital Signs Last 24 Hrs  T(C): 36.5 (18 Jan 2024 09:42), Max: 37.1 (17 Jan 2024 21:18)  T(F): 97.7 (18 Jan 2024 09:42), Max: 98.8 (17 Jan 2024 21:18)  HR: 72 (18 Jan 2024 09:42) (72 - 74)  BP: 158/78 (18 Jan 2024 09:42) (137/74 - 158/78)  BP(mean): --  RR: 18 (18 Jan 2024 09:42) (18 - 18)  SpO2: 96% (18 Jan 2024 09:42) (96% - 97%)    Parameters below as of 18 Jan 2024 09:42  Patient On (Oxygen Delivery Method): room air    Physical Exam:  Constitutional: Awake / alert  HEENT: PERRLA, EOMI  Neck: Supple  Respiratory: Breath sounds are clear bilaterally  Cardiovascular: S1 and S2, regular rhythm  Gastrointestinal: Soft, NT/ND  Extremities:  no edema  Musculoskeletal: no abnormal movements  Skin: No rashes    Neurological Exam:  HF: A x O x 3, follows commands, normal affect, speech fluent  CN: PERRL, EOMI, no NLFD, tongue midline  UE: no hoffmans on exam, no hyper-reflexia  Motor: Strength 5/5 except R IP 3/5, QD/HS/DF/PF 4/5  Sens: Intact to light touch  Reflexes: Symmetric/depressed, no clonus, no Espinosa's   Coord:  No FNFA, dysmetria, JODIE intact   Gait/Balance: Cannot test    Radiology:  MRI Tspine  Disc desiccation is seen throughout the thoracic spine region.    T3-4: Bilateral hypertrophic facet joint changes are seen. Moderate   narrowing of the right neural foramen  T8-9: Bilateral hypertrophic facet joint changes seen. Moderate narrowing   of the left neural foramen  T9-10: Bilateral hypertrophic facet joint changes seen. Mild to moderate   narrowing of the right neural foramen  T10-11: Disc bulge and bilateral hypertrophic facet change seen. Moderate   to severe narrowing of the right neural foramen and severe narrowing of   the left neural foramen  T11-12: Bilateral hypertrophic facet joint changes seen. Mild narrowing   of left neural foramen and moderate narrowing of the right neural foramen.  T12-L1: Bilateral hypertrophic facet changes are seen. Moderate narrowing   of the left neural foramen and severe narrowing of the right neural   foramen    Lumbar spine:  Lumbar spine:    Loss of the normal lordosis is seen    Scoliosis    There is evidence of subtle T1-T2 prolongation involving the inferior   endplate of L1. While this could be the result of chronic degenerative   changes, this is more likely due to edema from trauma given that there is   a possible fracture seeninvolving the posterior inferior corner of this   vertebral body.    There is abnormal T1 and T2 prolongation involving the L2 vertebral body   with possible associated acute fracture identified.    Compression fracture is seen involving the L3 vertebral body with   associated T1-T2 prolongation. This likely compatible with an acute   compression fracture secondary to patient's known trauma. Mild   retropulsed fragment is seen causing mild narrowing of the spinal canal.    Disc desiccation is seen throughout the lumbar spine region secondary to   chronic degenerative changes.    L1-2: Disc bulge and bilateral hypertrophic facet change. Mild narrowing   of the spinal canal. Mild narrowing of the right neural foramen    L2-3: Disc bulge and bilateral hypertrophic facet joint changes seen.   Mild to moderate narrowing of the spinal canal.    L3-4: Disc bulge and bilateral hypertrophic facet joint changes. Mild to   moderate narrowing of the spinal canal. Moderate narrowing of both neural   foramen.    L4-5: Disc bulge and bilateral hypertrophic facet change seen. Mild to   moderate narrowing of the spinal canal. Mild narrowing of the right   neural foramen    L4-5: Grade 1 anterolisthesis is seen. Disc bulge and bilateral   hypertrophic facet joint changes seen. Mild narrowing of the spinal   canal. Mild narrowing of the right neural foramen    The conus ends at the bottom of L2 and appears normal.    Evaluation of the paraspinal soft tissues appear normal.    Both SI joints appear intact.    < from: MR Cervical Spine No Cont (01.17.24 @ 13:56) >  IMPRESSION:  1. Straightening of the cervical lordosis can be seen in the setting of   muscle spasm.  2. C4-C5 disc bulge-spondylitic ridgecomplex, flattening the ventral   cord, resulting in mild central canal and mild bilateral neural foramen   stenosis with uncovertebral spurring.  3. C5-C6 disc bulge-spondylitic ridge complex, flattening the ventral   cord, resulting in mild centralcanal and moderate right neural foramen   stenosis with uncovertebral spurring.  4. C6-C7 disc bulge-spondylitic ridge complex, effacing the subarachnoid   space, resulting in mild central canal and mild right neural foramen   stenosis with uncovertebral spurring.  5. No definitive evidence of abnormal cord signal, cord edema or atrophy.  6. Additional findings, including those degenerative, described in detail   above.    < end of copied text >

## 2024-01-18 NOTE — DISCHARGE NOTE PROVIDER - NSDCCPCAREPLAN_GEN_ALL_CORE_FT
PRINCIPAL DISCHARGE DIAGNOSIS  Diagnosis: Lumbar compression fracture  Assessment and Plan of Treatment: SP Fall with compression fracture. You were evaluated by surgery and suggest you use your brace for comfort and stability. You are going to rehab to get stronger. Please continue medications as prescribed and FU with ortho and PCP in 1-2 weeks.

## 2024-01-18 NOTE — DISCHARGE NOTE NURSING/CASE MANAGEMENT/SOCIAL WORK - NSDCPEFALRISK_GEN_ALL_CORE
For information on Fall & Injury Prevention, visit: [email or web address] OR  [email or web address] OR  https://www.cdc.gov/steadi/patient.html

## 2024-01-18 NOTE — PROGRESS NOTE ADULT - ASSESSMENT
74 y/o female with PMHx of presents to the ED c/o right lower lack/buttock/hip pain that started last week. Pt reports she slipped and did nearly a split, reports several days later she was getting out of recliner. Pt daily drinker, having difficulty ambulating without assistance. Pt denies head trauma, denies anticoagulation usage. Pt moves all extremities denies saddle type anesthesia or terrence-anal anesthesia.  report regular bowel and bladder events.  Ambulation & transitions causes significant LBP limiting her mobility past couple days.    - OOB mobilize PT eval with brace  - MRI T/L Spine reviewed Chronic, severe compression fx T12 with retropulsion but no compression of neural elements.  - MR Lspine: acute L1-L3 compression fractures.   - MR of upper Tspine shows Cervical / Thoracic junction spinal stenosis - MRI Cervical spine without any profound evidence of cord compression, negative for signal changes  - No signs / symptoms of clonuse / hyper-reflexia / spasticity - However with falls will eval degree of C/T stenosis.  - Hx of EtOH use - could be contributing factor to recent falls with identified brain atrophy.  - T12 compression fracture compared to Xray 2021, appears unchanged  - PT/OOB with LSO brace as tolerated  - LSO brace when OOB or if HOB >30 degrees  - CTH negative, + atrophy, could be secondary to etoh.  - Tremors at rest, rec neurology follow-up outpatient.   - XR knees: daughter request c/o knee pain s/p multiple falls - no fxr's / dislocation but bruising noted due to fall on PE.  - DVT ppx SQH, SCDs    D/w Dr. Matute  Care time:   45 minutes spent on total encounter; more than 50% of the visit was spent counseling and / or coordinating care by the Neurosurgical team.  While the remainder including evaluating the patient, medical record, imaging studies, and discussions with fellow staff members / patient / and or family.

## 2024-01-18 NOTE — DISCHARGE NOTE PROVIDER - TIME SPENT: (MINUTES SPENT ON THE DISCHARGE SERVICE)
Diagnosed by Endocrinology with   1. Hyperprolactemia  2. Pituitary Adenoma  3. Obesity  4. Hirsutism  5. Irregular Menses  6. Vitamin D Deficiency    Kai Anderson LPN      4/25/2018    1:14 PM      Per patient she was told to repeat MRI this year for pituitary mass.    36

## 2024-01-26 DIAGNOSIS — Z91.81 HISTORY OF FALLING: ICD-10-CM

## 2024-01-26 DIAGNOSIS — S80.02XA CONTUSION OF LEFT KNEE, INITIAL ENCOUNTER: ICD-10-CM

## 2024-01-26 DIAGNOSIS — M48.02 SPINAL STENOSIS, CERVICAL REGION: ICD-10-CM

## 2024-01-26 DIAGNOSIS — S32.019A UNSPECIFIED FRACTURE OF FIRST LUMBAR VERTEBRA, INITIAL ENCOUNTER FOR CLOSED FRACTURE: ICD-10-CM
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
## 2024-01-26 DIAGNOSIS — Z79.899 OTHER LONG TERM (CURRENT) DRUG THERAPY: ICD-10-CM

## 2024-01-26 DIAGNOSIS — I10 ESSENTIAL (PRIMARY) HYPERTENSION: ICD-10-CM

## 2024-01-26 DIAGNOSIS — F10.10 ALCOHOL ABUSE, UNCOMPLICATED: ICD-10-CM

## 2024-01-26 DIAGNOSIS — M25.561 PAIN IN RIGHT KNEE: ICD-10-CM

## 2024-01-26 DIAGNOSIS — S80.01XA CONTUSION OF RIGHT KNEE, INITIAL ENCOUNTER: ICD-10-CM

## 2024-01-26 DIAGNOSIS — S32.029A UNSPECIFIED FRACTURE OF SECOND LUMBAR VERTEBRA, INITIAL ENCOUNTER FOR CLOSED FRACTURE: ICD-10-CM

## 2024-01-26 DIAGNOSIS — F32.A DEPRESSION, UNSPECIFIED: ICD-10-CM

## 2024-01-26 DIAGNOSIS — R29.6 REPEATED FALLS: ICD-10-CM

## 2024-01-26 DIAGNOSIS — M25.562 PAIN IN LEFT KNEE: ICD-10-CM

## 2024-01-26 DIAGNOSIS — M48.04 SPINAL STENOSIS, THORACIC REGION: ICD-10-CM
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
## 2024-01-26 DIAGNOSIS — Z28.9 IMMUNIZATION NOT CARRIED OUT FOR UNSPECIFIED REASON: ICD-10-CM

## 2024-01-26 DIAGNOSIS — Z11.52 ENCOUNTER FOR SCREENING FOR COVID-19: ICD-10-CM

## 2024-01-26 DIAGNOSIS — S32.039A UNSPECIFIED FRACTURE OF THIRD LUMBAR VERTEBRA, INITIAL ENCOUNTER FOR CLOSED FRACTURE: ICD-10-CM

## 2024-01-26 DIAGNOSIS — M85.80 OTHER SPECIFIED DISORDERS OF BONE DENSITY AND STRUCTURE, UNSPECIFIED SITE: ICD-10-CM

## 2024-02-15 ENCOUNTER — RESULT REVIEW (OUTPATIENT)
Age: 74
End: 2024-02-15

## 2024-02-15 ENCOUNTER — APPOINTMENT (OUTPATIENT)
Dept: CT IMAGING | Facility: CLINIC | Age: 74
End: 2024-02-15
Payer: MEDICARE

## 2024-02-15 ENCOUNTER — OUTPATIENT (OUTPATIENT)
Dept: OUTPATIENT SERVICES | Facility: HOSPITAL | Age: 74
LOS: 1 days | End: 2024-02-15
Payer: MEDICARE

## 2024-02-15 ENCOUNTER — APPOINTMENT (OUTPATIENT)
Dept: NEUROSURGERY | Facility: CLINIC | Age: 74
End: 2024-02-15
Payer: MEDICARE

## 2024-02-15 VITALS
HEIGHT: 65 IN | DIASTOLIC BLOOD PRESSURE: 80 MMHG | OXYGEN SATURATION: 97 % | BODY MASS INDEX: 22.99 KG/M2 | HEART RATE: 73 BPM | SYSTOLIC BLOOD PRESSURE: 123 MMHG | WEIGHT: 138 LBS

## 2024-02-15 DIAGNOSIS — F10.10 ALCOHOL ABUSE, UNCOMPLICATED: ICD-10-CM

## 2024-02-15 DIAGNOSIS — R29.6 REPEATED FALLS: ICD-10-CM

## 2024-02-15 DIAGNOSIS — M54.50 LOW BACK PAIN, UNSPECIFIED: ICD-10-CM

## 2024-02-15 DIAGNOSIS — Z98.890 OTHER SPECIFIED POSTPROCEDURAL STATES: Chronic | ICD-10-CM

## 2024-02-15 PROCEDURE — 72131 CT LUMBAR SPINE W/O DYE: CPT | Mod: 26,MH

## 2024-02-15 PROCEDURE — 99214 OFFICE O/P EST MOD 30 MIN: CPT

## 2024-02-15 PROCEDURE — 76376 3D RENDER W/INTRP POSTPROCES: CPT | Mod: 26,76

## 2024-02-15 PROCEDURE — 72128 CT CHEST SPINE W/O DYE: CPT | Mod: 26,MH

## 2024-02-15 PROCEDURE — 76376 3D RENDER W/INTRP POSTPROCES: CPT

## 2024-02-15 PROCEDURE — 72131 CT LUMBAR SPINE W/O DYE: CPT

## 2024-02-15 PROCEDURE — 72128 CT CHEST SPINE W/O DYE: CPT

## 2024-02-19 ENCOUNTER — APPOINTMENT (OUTPATIENT)
Dept: RADIOLOGY | Facility: CLINIC | Age: 74
End: 2024-02-19
Payer: MEDICARE

## 2024-02-19 ENCOUNTER — OUTPATIENT (OUTPATIENT)
Dept: OUTPATIENT SERVICES | Facility: HOSPITAL | Age: 74
LOS: 1 days | End: 2024-02-19
Payer: MEDICARE

## 2024-02-19 ENCOUNTER — APPOINTMENT (OUTPATIENT)
Dept: CT IMAGING | Facility: CLINIC | Age: 74
End: 2024-02-19

## 2024-02-19 DIAGNOSIS — R29.6 REPEATED FALLS: ICD-10-CM

## 2024-02-19 DIAGNOSIS — Z98.890 OTHER SPECIFIED POSTPROCEDURAL STATES: Chronic | ICD-10-CM

## 2024-02-19 PROCEDURE — 72131 CT LUMBAR SPINE W/O DYE: CPT | Mod: 26,MH

## 2024-02-19 PROCEDURE — 77080 DXA BONE DENSITY AXIAL: CPT | Mod: 26

## 2024-02-19 PROCEDURE — 77080 DXA BONE DENSITY AXIAL: CPT

## 2024-02-19 PROCEDURE — 72131 CT LUMBAR SPINE W/O DYE: CPT

## 2024-02-22 ENCOUNTER — APPOINTMENT (OUTPATIENT)
Dept: NEUROSURGERY | Facility: CLINIC | Age: 74
End: 2024-02-22
Payer: MEDICARE

## 2024-02-22 VITALS
DIASTOLIC BLOOD PRESSURE: 68 MMHG | HEART RATE: 71 BPM | SYSTOLIC BLOOD PRESSURE: 112 MMHG | HEIGHT: 65 IN | WEIGHT: 138 LBS | BODY MASS INDEX: 22.99 KG/M2 | OXYGEN SATURATION: 95 %

## 2024-02-22 PROCEDURE — 99214 OFFICE O/P EST MOD 30 MIN: CPT

## 2024-02-24 NOTE — CONSULT LETTER
[Dear  ___] : Dear  [unfilled], [Courtesy Letter:] : I had the pleasure of seeing your patient, [unfilled], in my office today. [Sincerely,] : Sincerely, [FreeTextEntry2] : Terry Barbour  [place] [address] [FreeTextEntry1] : This now 73-year-old patient is seen in my office for follow up assessment of lumbar compression fractures. The patient was admitted to [facility] in early January after a fall. Alcohol was involved. There is some chronic alcoholism behavior issues. As a result of the patient's most recent fall, she suffered acute minor compression fractures of L2 and L1, as well as a more involved but non-compressed L3 fracture. The patient was maintained in a TLSO brace and acute pain management rendered. Once the patient was mobile, she has been transitioned to inpatient rehabilitation to assist with mobilization and recovery of balance secondary to her other medical issues. The patient indicates that she is now able to stand and walk with the support of a walker. She denies any significant weakness or numbness in the lower extremities. She does notice back pain, which can be intense radiating toward the right flank. She does not describe it as sharp and stabbing. Bowel and bladder function have remained normal.  The patient presents after undergoing an updated CT scan of the lumbar spine. This once again demonstrates an old and established but significant T12 fracture. The L1 and L2 fractures are stable. The L3 fracture, however, shows significant vertebral height loss, as well as some bone migration into the canal. The canal is now approximately one-third compromised.  On examination, the patient is in a wheelchair for ease of transport. She is wearing her TLSO brace appropriately. The patient in the wheelchair is able to elevate both knees with good hip flexion strength. There is also good independent knee extension and plantar and dorsiflexion strength. Sensation to light touch in both lower extremities is normal.  I am quite concerned given the patient's past history of osteopenia and the current progressive nature of her fracture at L3, that she in fact has osteoporosis at this point. The patient is neurologically intact. I am concerned about any aggressive surgical intervention given the integrity of her bones in general. Given that she is neurologically intact, we will proceed cautiously and conservatively. The patient will maintain her brace. I will see the patient again in just one week to make sure that in the short interval there is no further migration or collapse of the L3 vertebra. If the patient establishes increasing pain and new neurologic deficits, then we will have to consider surgical intervention which may involve decompression with an L3 laminectomy and fusion involving superior and inferior adjacent vertebra. The patient and her family indicate a good understanding.  Thank you for kindly including me in the evaluation and ongoing support of your patient. Please do not hesitate to contact me should you have any questions or concerns regarding this evaluation, the patient's recent lower back fracture injury or her ongoing follow-up care and surveillance specifically for progressive L3 vertebral fracture which is pathologic.  [FreeTextEntry3] : Carlos Eduardo Matute MD, PhD, FRCPSC  Attending Neurosurgeon   of Neurosurgery  [facility]  [address], 2nd [place] [address]  Office: [phone]  Fax: [phone]

## 2024-02-26 ENCOUNTER — APPOINTMENT (OUTPATIENT)
Dept: RHEUMATOLOGY | Facility: CLINIC | Age: 74
End: 2024-02-26
Payer: MEDICARE

## 2024-02-26 VITALS
HEART RATE: 76 BPM | TEMPERATURE: 97.1 F | WEIGHT: 138 LBS | DIASTOLIC BLOOD PRESSURE: 74 MMHG | SYSTOLIC BLOOD PRESSURE: 122 MMHG | OXYGEN SATURATION: 96 % | BODY MASS INDEX: 22.99 KG/M2 | HEIGHT: 65 IN

## 2024-02-26 PROCEDURE — 99204 OFFICE O/P NEW MOD 45 MIN: CPT

## 2024-02-26 PROCEDURE — G2211 COMPLEX E/M VISIT ADD ON: CPT

## 2024-02-26 RX ORDER — GABAPENTIN 400 MG
400 TABLET ORAL
Refills: 0 | Status: ACTIVE | COMMUNITY

## 2024-02-26 RX ORDER — ZOLEDRONIC ACID 5 MG/100ML
5 INJECTION INTRAVENOUS
Qty: 1 | Refills: 1 | Status: ACTIVE | COMMUNITY
Start: 2024-02-26

## 2024-02-26 RX ORDER — OXYCODONE 5 MG/1
5 TABLET ORAL
Refills: 0 | Status: ACTIVE | COMMUNITY

## 2024-02-26 RX ADMIN — ZOLEDRONIC ACID 0 MG/100ML: 0.05 INJECTION, SOLUTION INTRAVENOUS at 00:00

## 2024-02-26 NOTE — DATA REVIEWED
[FreeTextEntry1] : DEXA 2/2024: Femoral neck and total hip T score -3.4, osteoporosis; Spine -0.5, normal

## 2024-02-26 NOTE — ASSESSMENT
[FreeTextEntry1] : 73F presenting for evaluation and treatment of severe osteoporosis identified on DEXA scan 2/2024, with T score -3.4 in femoral neck and total hip. She recently had a fall resulting in several vertebral fractures, was hospitalized for this.   Patient was advised to take calcium 1200 mg daily and vitamin D 1000U daily at minimum. Due to her vertebral compression fractures, she was told that SubQ daily Forteo injections were most preferred- however, patient lives alone and cannot self administer medications (reports being tremulous); so unfortunately this is not an option at this time.  I recommended IV reclast as the next best option- patient agreeable to this, she will come once yearly for Reclast infusions. Potential side effects reviewed including small risk of jaw osteonecrosis. She has no pending invasive dental work. I have ordered Vitamin D, TSH, PTH today. She had a CMP done in the last 2 weeks showing normal renal function and normal calcium levels.   She states she has not had alcohol in the past 2 months however prior to that was having 3+ drinks per week, I advised her to seriously cut down on her alcohol consumption as this contributes to bone thinning as well.   Pt advised to follow up in 1 year with me, and to repeat DEXA in 2 years (2/2026).

## 2024-02-26 NOTE — PHYSICAL EXAM
[General Appearance - Alert] : alert [General Appearance - Well Developed] : well developed [General Appearance - In No Acute Distress] : in no acute distress [General Appearance - Well-Appearing] : healthy appearing [Extraocular Movements] : extraocular movements were intact [Sclera] : the sclera and conjunctiva were normal [Exaggerated Use Of Accessory Muscles For Inspiration] : no accessory muscle use [] : no respiratory distress [Edema] : there was no peripheral edema [Musculoskeletal - Swelling] : no joint swelling seen [Oriented To Time, Place, And Person] : oriented to person, place, and time [Skin Color & Pigmentation] : normal skin color and pigmentation [Mood] : the mood was normal [Affect] : the affect was normal [FreeTextEntry1] : patient wearing back brace

## 2024-02-26 NOTE — REASON FOR VISIT
[Initial Eval - Existing Diagnosis] : an initial evaluation of an existing diagnosis [FreeTextEntry1] : severe osteoporosis

## 2024-02-28 LAB
25(OH)D3 SERPL-MCNC: 26.9 NG/ML
CALCIUM SERPL-MCNC: 9.5 MG/DL
PARATHYROID HORMONE INTACT: 22 PG/ML
TSH SERPL-ACNC: 1.47 UIU/ML

## 2024-02-29 ENCOUNTER — APPOINTMENT (OUTPATIENT)
Dept: NEUROSURGERY | Facility: CLINIC | Age: 74
End: 2024-02-29

## 2024-03-06 ENCOUNTER — RESULT REVIEW (OUTPATIENT)
Age: 74
End: 2024-03-06

## 2024-03-06 ENCOUNTER — APPOINTMENT (OUTPATIENT)
Dept: NEUROSURGERY | Facility: CLINIC | Age: 74
End: 2024-03-06
Payer: MEDICARE

## 2024-03-06 ENCOUNTER — OUTPATIENT (OUTPATIENT)
Dept: OUTPATIENT SERVICES | Facility: HOSPITAL | Age: 74
LOS: 1 days | End: 2024-03-06
Payer: MEDICARE

## 2024-03-06 ENCOUNTER — APPOINTMENT (OUTPATIENT)
Dept: CT IMAGING | Facility: CLINIC | Age: 74
End: 2024-03-06
Payer: MEDICARE

## 2024-03-06 DIAGNOSIS — F10.10 ALCOHOL ABUSE, UNCOMPLICATED: ICD-10-CM

## 2024-03-06 DIAGNOSIS — Z98.890 OTHER SPECIFIED POSTPROCEDURAL STATES: Chronic | ICD-10-CM

## 2024-03-06 DIAGNOSIS — M54.50 LOW BACK PAIN, UNSPECIFIED: ICD-10-CM

## 2024-03-06 DIAGNOSIS — S32.030D WEDGE COMPRESSION FRACTURE OF THIRD LUMBAR VERTEBRA, SUBSEQUENT ENCOUNTER FOR FRACTURE WITH ROUTINE HEALING: ICD-10-CM

## 2024-03-06 DIAGNOSIS — S32.020D WEDGE COMPRESSION FRACTURE OF SECOND LUMBAR VERTEBRA, SUBSEQUENT ENCOUNTER FOR FRACTURE WITH ROUTINE HEALING: ICD-10-CM

## 2024-03-06 DIAGNOSIS — R29.6 REPEATED FALLS: ICD-10-CM

## 2024-03-06 DIAGNOSIS — S32.010D WEDGE COMPRESSION FRACTURE OF FIRST LUMBAR VERTEBRA, SUBSEQUENT ENCOUNTER FOR FRACTURE WITH ROUTINE HEALING: ICD-10-CM

## 2024-03-06 PROCEDURE — 72131 CT LUMBAR SPINE W/O DYE: CPT

## 2024-03-06 PROCEDURE — 72131 CT LUMBAR SPINE W/O DYE: CPT | Mod: 26,MH

## 2024-03-06 PROCEDURE — 76376 3D RENDER W/INTRP POSTPROCES: CPT

## 2024-03-06 PROCEDURE — 76376 3D RENDER W/INTRP POSTPROCES: CPT | Mod: 26

## 2024-03-06 PROCEDURE — 99214 OFFICE O/P EST MOD 30 MIN: CPT

## 2024-03-14 ENCOUNTER — APPOINTMENT (OUTPATIENT)
Dept: RHEUMATOLOGY | Facility: CLINIC | Age: 74
End: 2024-03-14
Payer: MEDICARE

## 2024-03-14 VITALS
DIASTOLIC BLOOD PRESSURE: 65 MMHG | SYSTOLIC BLOOD PRESSURE: 100 MMHG | RESPIRATION RATE: 18 BRPM | TEMPERATURE: 97.9 F | OXYGEN SATURATION: 95 % | HEART RATE: 72 BPM

## 2024-03-14 VITALS
DIASTOLIC BLOOD PRESSURE: 63 MMHG | SYSTOLIC BLOOD PRESSURE: 105 MMHG | RESPIRATION RATE: 18 BRPM | OXYGEN SATURATION: 92 % | HEART RATE: 80 BPM

## 2024-03-14 PROCEDURE — 96374 THER/PROPH/DIAG INJ IV PUSH: CPT

## 2024-03-14 RX ORDER — ZOLEDRONIC ACID 5 MG/100ML
5 INJECTION INTRAVENOUS
Qty: 0 | Refills: 0 | Status: COMPLETED | OUTPATIENT
Start: 2024-02-26

## 2024-03-14 NOTE — HISTORY OF PRESENT ILLNESS
[Denies] : Denies [N/A] : N/A [Yes] : Yes [Vitamin D] : Vitamin D [Bone Density/DEXA] : Bone Density/DEXA [Left upper extremity] : Left upper extremity [24g] : 24g [Start Time: ___] : Medication Start Time: [unfilled] [End Time: ___] : Medication End Time: [unfilled] [Medication Name: ___] : Medication Name: [unfilled] [Total Amount Administered: ___] : Total Amount Administered: [unfilled] [IV discontinued. Intact. No signs or symptoms of IV complications noted. Time: ___] : IV discontinued. Intact. No signs or symptoms of IV complications noted. Time: [unfilled] [Patient  instructed to seek medical attention with signs and symptoms of adverse effects] : Patient  instructed to seek medical attention with signs and symptoms of adverse effects [Patient left unit in no acute distress] : Patient left unit in no acute distress [Medications administered as ordered and tolerated well.] : Medications administered as ordered and tolerated well. [de-identified] : Patient reports previous falls.  [de-identified] : 03:00 pm [de-identified] : This is patients first infusion. [de-identified] : Patient kept for observation for 20 mins post infusion. No adverse reactions noted.

## 2024-03-20 DIAGNOSIS — R11.0 NAUSEA: ICD-10-CM

## 2024-03-20 RX ORDER — ONDANSETRON 4 MG/1
4 TABLET ORAL
Qty: 14 | Refills: 1 | Status: ACTIVE | COMMUNITY
Start: 2024-03-20 | End: 1900-01-01

## 2024-03-21 ENCOUNTER — NON-APPOINTMENT (OUTPATIENT)
Age: 74
End: 2024-03-21

## 2024-03-24 ENCOUNTER — NON-APPOINTMENT (OUTPATIENT)
Age: 74
End: 2024-03-24

## 2024-03-25 RX ORDER — TRAMADOL HYDROCHLORIDE AND ACETAMINOPHEN 37.5; 325 MG/1; MG/1
37.5-325 TABLET, FILM COATED ORAL DAILY
Qty: 20 | Refills: 0 | Status: ACTIVE | COMMUNITY
Start: 2024-03-25 | End: 1900-01-01

## 2024-03-25 NOTE — CONSULT LETTER
[Dear  ___] : Dear  [unfilled], [Courtesy Letter:] : I had the pleasure of seeing your patient, [unfilled], in my office today. [Sincerely,] : Sincerely, [FreeTextEntry2] : Terry Barbour MD [FreeTextEntry1] : This 73-year-old woman returns to our office for further close surveillance of her acute lumbar compression fractures.  The patient recently suffered an acute fall secondary to alcohol intoxication which resulted in an L1, and L2 minor superior endplate fractures as well as a more significant progressive L3 fracture.  There is a past stable T12 spontaneous fracture.  The patient has severe osteoporosis which has been untreated.  The patient has been treated conservatively with TLSO bracing because of the severe nature of her osteoporosis.  Since we saw the patient last she has undergone a DEXA scan which identifies a T-score of -3.4.  She has been evaluated by rheumatology for treatment of osteoporosis and the recommendation for intravenous Reclast has been made.  The patient indicates that she is no longer at acute rehabilitation and is now returned covering at home.  She is wearing her TLSO brace as instructed.  She is increasing her daily activity and walking with the support of a walker for safety purposes.  The patient denies any new weakness or sensory changes in the lower extremities.  Bowel and bladder function remain normal.  The patient returns after undergoing an additional updated CT scan of the lumbar spine.  At the previous imaging I pointed out that she had further collapse and a slight change in the migration of the bone fragment into the canal compromising it approximately by one third.  Fortunately, the most recent CT does not show any further bone collapse or retropulsion.  There is no change in the patient's neurologic evaluation since we saw her previously.  The patient indicates that her pain has continued to improve and is now a relative dull ache.  She is no longer taking consistent oxycodone.  I have had an extended discussion with the patient regarding the importance of her behavior, and nutrition in her recovery.  The patient is strongly encouraged to continue with her walking exercises.  The patient is also encouraged to continue care with rheumatology for active treatment of osteoporosis as it is still very likely in the future the patient will need some sort of surgical intervention and stabilization.  Currently with the degree of bone density this would actually be more harmful to the patient with a high propensity for failure.  The patient is well-informed regarding signs or symptoms that would require acute reevaluation or intervention.  I will see the patient again in 6 weeks with a further imaging study of the lumbar spine.  If the next imaging study remains stable especially at the L3 level then we will consider adding additional outpatient physical therapy.  Thank you for kindly including me in the evaluation and ongoing support of your patient.  Please do not hesitate to contact me should you have any questions or concerns regarding this evaluation, the patient's recent treatment for traumatic multilevel lumbar compression fractures, or her ongoing follow-up care plan. [FreeTextEntry3] : Carlos Eduardo Matute MD, PhD, FRCPSC  Attending Neurosurgeon   of Neurosurgery  [facility]  [address], [address]nd [place] [address]  Office: [phone]  Fax: [phone]

## 2024-03-25 NOTE — CONSULT LETTER
[Dear  ___] : Dear  [unfilled], [Courtesy Letter:] : I had the pleasure of seeing your patient, [unfilled], in my office today. [Sincerely,] : Sincerely, [FreeTextEntry3] : Carlos Eduardo Matute MD, PhD, FRCPSC  Attending Neurosurgeon   of Neurosurgery  [facility]  [address], [address]nd [place] [address]  Office: [phone]  Fax: [phone]  [FreeTextEntry2] : Terry Bar[place] [address]  [FreeTextEntry1] : This 73-year-old woman is seen in my office for further close evaluation of multiple lumbar compression fractures.  You may recall that the patient was recently hospitalized at [facility] for an acute fall associated with intoxication that resulted in an L1 and L2 minor compression fractures as well as a more complex L3 fracture that demonstrated progressive collapse and some minor canal retropulsion over time.  The patient fortunately has remained neurologically intact.  She has been treated conservatively with a TLSO brace secondary to significant osteoporosis.  Since we saw the patient previously she has continued to be supported in a nursing home for acute rehabilitation.  She is increasing her walking abilities.  She is using a wheeled walker.  The patient indicates frustration at her lack of independence in this facility between treatment sessions.  The patient indicates no complications with bowel or bladder control.  The patient has not had any problems with balance.  The patient indicates her lower back pain at the fracture site has continued to improve.  She is taking only occasional oxycodone for pain control.  I have reviewed with the patient as well as the patient's daughter who is present in support her most recent CT scan.  I have demonstrated the old and stable T12 fracture as well as the newer and stable superior endplate fractures at L1 and L2.  I have pointed out the significant progression at the previous imaging of the L3 fracture.  The degree of retropulsion and compression however is thankfully stable since the prior imaging.  There is a slight kyphotic defect in the spinal alignment related to the fracture.  On examination the patient is in no acute distress.  She is currently sitting in a wheelchair for ease of mobilization today.  The patient is able to stand without assistance.  There continues to be some focal pain at the fracture site to mobilization and to gentle percussion.  Otherwise the patient has very reasonable hip flexion and knee extension strength.  The patient denies any focal sensory changes in the lower extremities.  I have expressed my ongoing significant concern with respect to the patient's future mobility and the possible need for fusion surgery.  However, given the patient's significant osteoporosis surgical intervention currently would pose additional risk to the patient and high likelihood of construct failure.  Given that the patient is neurologically intact and her pain is improving we will persist with a conservative approach.  The patient will be seen by rheumatology for review and consideration of much-needed treatment for osteoporosis.  The patient is aware that in the future she may yet still need decompression and stabilization surgery.  The patient has indicated that it is her intention to leave her rehabilitation facility to return home.  We have discussed the substantial risk of her resuming alcohol consumption and the implications for her overall life and health span.  The patient is to continue with exercise efforts.  In particular she should be walking on a daily basis.  More aggressive range of motion or other physical therapy is not yet appropriate because of the tenuous early healing of her L3 fracture.  I would like to see the patient gain in 1 week with further spinal imaging because of the recent acute progression of her fracture which is deeply concerning.  Thank you for kindly including me in the evaluation and support of your patient.  Please do not hesitate to contact me should you have any questions or concerns regarding the patient's recent hospitalization for a fall associated with new compression fractures of the lumbar spine or her ongoing follow-up care plan.

## 2024-03-28 ENCOUNTER — NON-APPOINTMENT (OUTPATIENT)
Age: 74
End: 2024-03-28

## 2024-04-03 ENCOUNTER — APPOINTMENT (OUTPATIENT)
Dept: NEUROSURGERY | Facility: CLINIC | Age: 74
End: 2024-04-03
Payer: MEDICARE

## 2024-04-03 ENCOUNTER — APPOINTMENT (OUTPATIENT)
Dept: CT IMAGING | Facility: CLINIC | Age: 74
End: 2024-04-03
Payer: MEDICARE

## 2024-04-03 ENCOUNTER — RESULT REVIEW (OUTPATIENT)
Age: 74
End: 2024-04-03

## 2024-04-03 ENCOUNTER — OUTPATIENT (OUTPATIENT)
Dept: OUTPATIENT SERVICES | Facility: HOSPITAL | Age: 74
LOS: 1 days | End: 2024-04-03
Payer: MEDICARE

## 2024-04-03 VITALS
BODY MASS INDEX: 22.99 KG/M2 | HEIGHT: 65 IN | DIASTOLIC BLOOD PRESSURE: 70 MMHG | WEIGHT: 138 LBS | HEART RATE: 82 BPM | SYSTOLIC BLOOD PRESSURE: 113 MMHG | OXYGEN SATURATION: 97 %

## 2024-04-03 DIAGNOSIS — R29.6 REPEATED FALLS: ICD-10-CM

## 2024-04-03 DIAGNOSIS — Z98.890 OTHER SPECIFIED POSTPROCEDURAL STATES: Chronic | ICD-10-CM

## 2024-04-03 DIAGNOSIS — S32.030D WEDGE COMPRESSION FRACTURE OF THIRD LUMBAR VERTEBRA, SUBSEQUENT ENCOUNTER FOR FRACTURE WITH ROUTINE HEALING: ICD-10-CM

## 2024-04-03 DIAGNOSIS — M54.50 LOW BACK PAIN, UNSPECIFIED: ICD-10-CM

## 2024-04-03 DIAGNOSIS — Z00.8 ENCOUNTER FOR OTHER GENERAL EXAMINATION: ICD-10-CM

## 2024-04-03 DIAGNOSIS — F10.10 ALCOHOL ABUSE, UNCOMPLICATED: ICD-10-CM

## 2024-04-03 DIAGNOSIS — S32.010D WEDGE COMPRESSION FRACTURE OF FIRST LUMBAR VERTEBRA, SUBSEQUENT ENCOUNTER FOR FRACTURE WITH ROUTINE HEALING: ICD-10-CM

## 2024-04-03 DIAGNOSIS — S32.020D WEDGE COMPRESSION FRACTURE OF SECOND LUMBAR VERTEBRA, SUBSEQUENT ENCOUNTER FOR FRACTURE WITH ROUTINE HEALING: ICD-10-CM

## 2024-04-03 DIAGNOSIS — M81.0 AGE-RELATED OSTEOPOROSIS W/OUT CURRENT PATHOLOGICAL FRACTURE: ICD-10-CM

## 2024-04-03 PROCEDURE — 72131 CT LUMBAR SPINE W/O DYE: CPT | Mod: 26,MH

## 2024-04-03 PROCEDURE — 76376 3D RENDER W/INTRP POSTPROCES: CPT | Mod: 26

## 2024-04-03 PROCEDURE — 72131 CT LUMBAR SPINE W/O DYE: CPT

## 2024-04-03 PROCEDURE — 76376 3D RENDER W/INTRP POSTPROCES: CPT

## 2024-04-03 PROCEDURE — 99214 OFFICE O/P EST MOD 30 MIN: CPT

## 2024-04-03 RX ORDER — OXYCODONE 5 MG/1
5 TABLET ORAL
Qty: 20 | Refills: 0 | Status: ACTIVE | COMMUNITY
Start: 2024-02-26 | End: 1900-01-01

## 2024-04-24 ENCOUNTER — NON-APPOINTMENT (OUTPATIENT)
Age: 74
End: 2024-04-24

## 2024-05-15 ENCOUNTER — NON-APPOINTMENT (OUTPATIENT)
Age: 74
End: 2024-05-15

## 2024-05-19 PROBLEM — M81.0 OSTEOPOROSIS, POSTMENOPAUSAL: Status: ACTIVE | Noted: 2024-02-26

## 2024-05-19 NOTE — REVIEW OF SYSTEMS
[Difficulty Walking] : difficulty walking [Negative] : Endocrine [Hand Weakness] : no hand weakness [Leg Weakness] : no leg weakness [Numbness] : no numbness [Tingling] : no tingling [de-identified] : back and right thigh anterior pain

## 2024-05-19 NOTE — CONSULT LETTER
[Dear  ___] : Dear  [unfilled], [Courtesy Letter:] : I had the pleasure of seeing your patient, [unfilled], in my office today. [Sincerely,] : Sincerely, [FreeTextEntry2] : Terry Barbour MD  [FreeTextEntry1] : This is a 73-year-old woman returns to our office for routine ongoing surveillance and support for multiple lumbar compression fractures.  The patient was initially seen at [facility] in early January 2024 after a fall in her home secondary to alcohol.  Multiple spinal fractures were identified including minor superior endplate fractures at L1 and L2 and a more complicated compression fracture with some slight degree of retropulsion at L3.  The patient was treated conservatively with TLSO brace.  The patient was subsequently identified to have significant osteoporosis with a T-score of -3.4.  The patient has now been connected with rheumatology and has initiated medical treatment for this using Reclast.  The patient indicates that she has some generalized body pain but otherwise no longer has any focal back pain.  She has been working on improving her nutritional status.  She is ambulating with the use of a wheeled walker for support.  She has not had any further falls.  She continues to take intermittent oxycodone for her pain.  Bowel and bladder function have remained normal.  There has been no change in the patient's physical examination since we saw her previously 1 month ago.  I have reviewed directly with the patient and her support aid her most recent CT scan of the lumbar spine.  I have once again pointed out the numerous compression fractures involving T11, T12, L1, L2, and L3 in particular.  I pointed out the relative central and foraminal stenosis created at L3-4.  Fortunately the L3 compression fracture has not shown further collapse or retropulsion into the canal.  I have discussed once again with the patient that she will have ongoing back pain as a result of her multiple fractures and general arthritis.  There is no current opportunity for surgical intervention given the severity of her osteoporosis.  At this point the patient may start to remove her TLSO brace when sitting or moving in her home but I recommend for longer standing and walking periods and for going out that she would still wear her brace intermittently.  The patient should increase her activity and exercise tolerance.  At this time I do not recommend that the patient would resume driving because of her overall condition and ongoing use of narcotics.  I will see the patient again in 4 months to evaluate her ongoing progress and recovery and the stability of her L3 fracture in particular.  The patient is to continue with physical therapy rehabilitation efforts to maximize her mobility and independence. [FreeTextEntry3] : Carlos Eduardo Matute MD, PhD, FRCPSC  Attending Neurosurgeon   of Neurosurgery  [facility]  [address], [address]nd [place] [address]  Office: [phone]  Fax: [phone]

## 2024-05-19 NOTE — REASON FOR VISIT
[Follow-Up: _____] : a [unfilled] follow-up visit [Friend] : friend [FreeTextEntry1] : T12. L2 L3 Spine fractures

## 2024-06-07 NOTE — ASU PATIENT PROFILE, ADULT - NS SC CAGE ALCOHOL CUT DOWN
Consult received 2/2 pt identified at risk of developing a pressure injury. RD already following pt. See nutrition notes for details. RD to continue to follow throughout admit.    no

## 2024-08-06 ENCOUNTER — RESULT REVIEW (OUTPATIENT)
Age: 74
End: 2024-08-06

## 2024-08-06 ENCOUNTER — APPOINTMENT (OUTPATIENT)
Dept: NEUROSURGERY | Facility: CLINIC | Age: 74
End: 2024-08-06

## 2024-08-06 ENCOUNTER — APPOINTMENT (OUTPATIENT)
Dept: CT IMAGING | Facility: CLINIC | Age: 74
End: 2024-08-06

## 2024-08-06 ENCOUNTER — OUTPATIENT (OUTPATIENT)
Dept: OUTPATIENT SERVICES | Facility: HOSPITAL | Age: 74
LOS: 1 days | End: 2024-08-06
Payer: MEDICARE

## 2024-08-06 DIAGNOSIS — M54.50 LOW BACK PAIN, UNSPECIFIED: ICD-10-CM

## 2024-08-06 DIAGNOSIS — S32.030D WEDGE COMPRESSION FRACTURE OF THIRD LUMBAR VERTEBRA, SUBSEQUENT ENCOUNTER FOR FRACTURE WITH ROUTINE HEALING: ICD-10-CM

## 2024-08-06 DIAGNOSIS — Z98.890 OTHER SPECIFIED POSTPROCEDURAL STATES: Chronic | ICD-10-CM

## 2024-08-06 PROCEDURE — 99213 OFFICE O/P EST LOW 20 MIN: CPT

## 2024-08-06 PROCEDURE — 76376 3D RENDER W/INTRP POSTPROCES: CPT | Mod: 26

## 2024-08-06 PROCEDURE — 72131 CT LUMBAR SPINE W/O DYE: CPT

## 2024-08-06 PROCEDURE — 72131 CT LUMBAR SPINE W/O DYE: CPT | Mod: 26,MH

## 2024-08-06 PROCEDURE — 76376 3D RENDER W/INTRP POSTPROCES: CPT

## 2024-08-06 NOTE — CONSULT LETTER
[Dear  ___] : Dear  [unfilled], [Courtesy Letter:] : I had the pleasure of seeing your patient, [unfilled], in my office today. [Referral Closing:] : Thank you very much for seeing this patient.  If you have any questions, please do not hesitate to contact me. [Sincerely,] : Sincerely, [FreeTextEntry2] : Terry Barbour MD [facility] [place] [id number]  [FreeTextEntry1] : This very pleasant now 74-year-old woman returns to our office for ongoing support after lumbar spinal fractures.  The patient was initially seen in [facility] in January 2024 after a fall in her home which resulted in an L1 and an L3 compression fracture with slight retropulsion into the canal.  The patient was neurologically intact and treated conservatively with TLSO bracing because of significant osteoporosis with a T-score of -3.4.  It is now 4 months since we saw the patient previously.  At this point in time the patient indicates she has undergone infusion therapy supported by rheumatology for her bone density treatment.  The patient has also completed a full course of physical therapy for the purposes of reconditioning.  The patient has only episodic pain in the lumbar spine region occasionally at night and some with some prolonged activities.  The patient says that she can do grocery shopping and standing her kitchen to do cooking for periods of 20 to 30 minutes without difficulty but then needs to rest.  She denies any focal radiating pain down her legs or focal weakness.  Bowel and bladder function have remained normal.  The patient returns today after undergoing an updated CT scan of the lumbar spine.  The spinal fractures are stable in nature.  There is still some retropulsion into the canal at L1 and L3 but this has not shown any migration and there appears to be good bone sclerosis consistent with healing.  There is no new angle abnormality.  There is still slight kyphosis at the level of L1.  On examination the patient is doing extremely well.  The patient shows no weakness or sensory deficit in the lower extremities.  The patient has very good lower back range of motion without sharp pain.  The patient can walk on toes and do heel raises without difficulty.  I am very pleased with the patient's overall progress and stability with conservative measures.  I am still concerned that at some point in time the patient may require stabilization and possible decompression especially at the L1-2 and L3-4 levels where there is stenosis.  Currently the patient's symptom profile does not require acute intervention.  I will see the patient again in my office in April 2025 and would like the patient to undergo an updated bone density scan to evaluate the degree of response to her osteoporosis treatment.  The patient is aware that with her current treatment and if there are degenerative changes in the future surgical decompression and the use of fusion may be necessary but it would be possible with a higher more stable bone density.  The patient is encouraged to continue with daily exercises to also improve her functional independence and overall health.  Thank you for very kindly including me in the evaluation and support of your patient.  Please do not hesitate to contact me should you have any questions or concerns regarding this evaluation or the patient's ongoing follow-up care. [FreeTextEntry3] : Carlos Eduardo Matute MD, PhD, FRCPSC  Attending Neurosurgeon   of Neurosurgery  [facility]  [address], [address]nd [place] [address]  Office: [phone]  Fax: [phone]

## 2024-08-06 NOTE — REASON FOR VISIT
[Follow-Up: _____] : a [unfilled] follow-up visit [Other: _____] : [unfilled] [FreeTextEntry1] : L1 L2 L3 fracture post fall

## 2024-08-06 NOTE — REVIEW OF SYSTEMS
[Negative] : Heme/Lymph [Numbness] : no numbness [Tingling] : no tingling [Difficulty Walking] : no difficulty walking [de-identified] : back pain - mild

## 2025-01-21 NOTE — REASON FOR VISIT
Nutrition Assessment   Reason for consult/assessment: Initial, BMI    Diagnosis, Labs, Medication, History: Reviewed    Pertinent nutrition history prior to admission: CHF, COPD, lung cancer, chemoradiation, C. Diff, severe protein calorie malnutrition.  Patient had recent admission 2 weeks ago and had nausea/vomiting and poor oral intake at that time and has reported ongoing poor appetite for the last 6 months.                               Oral diet order: Cardiac  Nutrition supplement/snacks: Ensure Plus HP 3 times a day           Diet tolerance: Tolerating with good appetite/intakes recorded, Tolerating with fair appetite/intakes recorded  Nutrition supplement / snack tolerance: Tolerating with good intake    Food allergies: None known    Anthropometrics Information  Usual weight: 68 kg  % Weight change: Weight loss of 4% in the last month and -15.5% in the last 6 months  Weight change significant: Yes  Reason for weight change: Decreased intake    Estimated Needs     Calculated energy needs: 9300-3806  kcal              Calculated protein needs: 68-85  g     Calculated Fluid Needs: 1ml/kcal               NFPE  Nutrition Focused Physical Exam  Physical Exam Completed: Yes (01/21/25 1409 : Neelima Walters, RD)   Body Fat  Orbital region: Mild/Moderate  Cheek region (buccal fat pads): Severe  Upper arm region (triceps/biceps): Severe  Thoracic and lumbar region (rib/lower back/mid-axillary line): Severe  Muscle Mass  Temporal region (temporalis muscle): Severe  Collarbone region (clavicle bone, pectoralis major, trapezius muscle): Severe  Shoulder region (deltoid muscle): Severe  Scapular bone region (trapezius, supraspinatus, infraspinatus muscles): Severe  Hand region: Severe  Patellar region (quadriceps muscle): Severe  Anterior thigh region (quadriceps muscle): Severe  Posterior calf region (gastrocnemius muscle): Severe                  Treatment Plan/Interventions   Meals & snacks: Cardiac diet; would recommend  liberlizing to Regular diet    Nutrition supplement therapy: Continue Ensure Plus HP 3 times a day (patient prefers strawberry)                                                                                          Goals & Monitoring  Intervention: Meals and snacks, Nutrition supplement therapy    Goal: Increase oral intake to >/equal 75% of meals and supplements  Intervention goal status: Initiated  Time frame to achieve goal: 3-5 days    Dietitian will monitor: Food, beverage, and nutrient intake          Nutrition Diagnosis/PES   Nutrition Diagnosis: Malnutrition in the context of chronic illness: Severe   Related to: Poor appetite     Malnutrition chronic; severe: Severe depletion of body fat, Severe depletion of muscle mass, Weight loss of >10%/6 months  Primary nutrition diagnosis status: New nutrition diagnosis                 [New Patient Visit] : a new patient visit [Family Member] : family member [FreeTextEntry1] : lumbar vertebrae fractures

## 2025-08-05 ENCOUNTER — APPOINTMENT (OUTPATIENT)
Dept: NEUROSURGERY | Facility: CLINIC | Age: 75
End: 2025-08-05

## 2025-08-05 ENCOUNTER — APPOINTMENT (OUTPATIENT)
Dept: RADIOLOGY | Facility: CLINIC | Age: 75
End: 2025-08-05
Payer: MEDICARE

## 2025-08-05 ENCOUNTER — APPOINTMENT (OUTPATIENT)
Dept: CT IMAGING | Facility: CLINIC | Age: 75
End: 2025-08-05
Payer: MEDICARE

## 2025-08-05 ENCOUNTER — NON-APPOINTMENT (OUTPATIENT)
Age: 75
End: 2025-08-05

## 2025-08-05 ENCOUNTER — OUTPATIENT (OUTPATIENT)
Dept: OUTPATIENT SERVICES | Facility: HOSPITAL | Age: 75
LOS: 1 days | End: 2025-08-05
Payer: MEDICARE

## 2025-08-05 ENCOUNTER — RESULT REVIEW (OUTPATIENT)
Age: 75
End: 2025-08-05

## 2025-08-05 VITALS
BODY MASS INDEX: 22.99 KG/M2 | HEART RATE: 70 BPM | OXYGEN SATURATION: 94 % | RESPIRATION RATE: 16 BRPM | DIASTOLIC BLOOD PRESSURE: 77 MMHG | WEIGHT: 138 LBS | SYSTOLIC BLOOD PRESSURE: 133 MMHG | HEIGHT: 65 IN

## 2025-08-05 DIAGNOSIS — S32.010D WEDGE COMPRESSION FRACTURE OF FIRST LUMBAR VERTEBRA, SUBSEQUENT ENCOUNTER FOR FRACTURE WITH ROUTINE HEALING: ICD-10-CM

## 2025-08-05 DIAGNOSIS — R29.5 TRANSIENT PARALYSIS: ICD-10-CM

## 2025-08-05 DIAGNOSIS — Z98.890 OTHER SPECIFIED POSTPROCEDURAL STATES: Chronic | ICD-10-CM

## 2025-08-05 DIAGNOSIS — M54.50 LOW BACK PAIN, UNSPECIFIED: ICD-10-CM

## 2025-08-05 DIAGNOSIS — M41.50 OTHER SECONDARY SCOLIOSIS, SITE UNSPECIFIED: ICD-10-CM

## 2025-08-05 PROCEDURE — 77085 DXA BONE DENSITY AXL VRT FX: CPT | Mod: 26

## 2025-08-05 PROCEDURE — 76376 3D RENDER W/INTRP POSTPROCES: CPT | Mod: 26

## 2025-08-05 PROCEDURE — 77085 DXA BONE DENSITY AXL VRT FX: CPT

## 2025-08-05 PROCEDURE — 99214 OFFICE O/P EST MOD 30 MIN: CPT

## 2025-08-05 PROCEDURE — 72131 CT LUMBAR SPINE W/O DYE: CPT | Mod: 26

## 2025-08-05 PROCEDURE — 72131 CT LUMBAR SPINE W/O DYE: CPT

## 2025-08-05 PROCEDURE — 76376 3D RENDER W/INTRP POSTPROCES: CPT

## 2025-08-29 ENCOUNTER — APPOINTMENT (OUTPATIENT)
Dept: RHEUMATOLOGY | Facility: CLINIC | Age: 75
End: 2025-08-29
Payer: MEDICARE

## 2025-08-29 VITALS
HEIGHT: 65 IN | DIASTOLIC BLOOD PRESSURE: 85 MMHG | OXYGEN SATURATION: 97 % | TEMPERATURE: 97.9 F | SYSTOLIC BLOOD PRESSURE: 128 MMHG | BODY MASS INDEX: 22.66 KG/M2 | HEART RATE: 73 BPM | WEIGHT: 136 LBS

## 2025-08-29 DIAGNOSIS — M81.0 AGE-RELATED OSTEOPOROSIS W/OUT CURRENT PATHOLOGICAL FRACTURE: ICD-10-CM

## 2025-08-29 PROCEDURE — G2211 COMPLEX E/M VISIT ADD ON: CPT

## 2025-08-29 PROCEDURE — 99214 OFFICE O/P EST MOD 30 MIN: CPT

## 2025-08-29 PROCEDURE — 36415 COLL VENOUS BLD VENIPUNCTURE: CPT

## 2025-08-29 RX ORDER — ZOLEDRONIC ACID 5 MG/100ML
5 INJECTION INTRAVENOUS
Qty: 1 | Refills: 1 | Status: ACTIVE | COMMUNITY
Start: 2025-08-29

## 2025-08-29 RX ORDER — ONDANSETRON 8 MG/1
8 TABLET, ORALLY DISINTEGRATING ORAL
Qty: 1 | Refills: 0 | Status: ACTIVE | COMMUNITY
Start: 2025-08-29

## 2025-08-29 RX ORDER — NALTREXONE 380 MG
KIT INTRAMUSCULAR
Refills: 0 | Status: ACTIVE | COMMUNITY

## 2025-09-03 LAB
25(OH)D3 SERPL-MCNC: 49.7 NG/ML
ALBUMIN SERPL ELPH-MCNC: 4.3 G/DL
ALP BLD-CCNC: 54 U/L
ALT SERPL-CCNC: 97 U/L
ANION GAP SERPL CALC-SCNC: 17 MMOL/L
AST SERPL-CCNC: 111 U/L
BILIRUB SERPL-MCNC: 0.9 MG/DL
BUN SERPL-MCNC: 17 MG/DL
CALCIUM SERPL-MCNC: 9.1 MG/DL
CHLORIDE SERPL-SCNC: 99 MMOL/L
CO2 SERPL-SCNC: 20 MMOL/L
CREAT SERPL-MCNC: 0.77 MG/DL
EGFRCR SERPLBLD CKD-EPI 2021: 81 ML/MIN/1.73M2
GLUCOSE SERPL-MCNC: 103 MG/DL
POTASSIUM SERPL-SCNC: 4.7 MMOL/L
PROT SERPL-MCNC: 7.1 G/DL
SODIUM SERPL-SCNC: 137 MMOL/L
TSH SERPL-ACNC: 3.25 UIU/ML